# Patient Record
Sex: MALE | Race: BLACK OR AFRICAN AMERICAN | NOT HISPANIC OR LATINO | Employment: FULL TIME | URBAN - METROPOLITAN AREA
[De-identification: names, ages, dates, MRNs, and addresses within clinical notes are randomized per-mention and may not be internally consistent; named-entity substitution may affect disease eponyms.]

---

## 2017-01-20 DIAGNOSIS — I10 ESSENTIAL HYPERTENSION: ICD-10-CM

## 2017-01-23 RX ORDER — LOSARTAN POTASSIUM AND HYDROCHLOROTHIAZIDE 25; 100 MG/1; MG/1
1 TABLET ORAL DAILY
Qty: 90 TABLET | Refills: 3 | Status: SHIPPED | OUTPATIENT
Start: 2017-01-23 | End: 2018-07-26

## 2017-08-23 ENCOUNTER — PATIENT OUTREACH (OUTPATIENT)
Dept: ADMINISTRATIVE | Facility: HOSPITAL | Age: 52
End: 2017-08-23

## 2018-03-26 ENCOUNTER — PATIENT OUTREACH (OUTPATIENT)
Dept: ADMINISTRATIVE | Facility: HOSPITAL | Age: 53
End: 2018-03-26

## 2018-06-26 ENCOUNTER — PATIENT OUTREACH (OUTPATIENT)
Dept: ADMINISTRATIVE | Facility: HOSPITAL | Age: 53
End: 2018-06-26

## 2018-06-26 NOTE — PROGRESS NOTES
I have attempted without success to contact this patient to schedule an appointment for annual to include blood pressure recheck and other health maintenance. Patient would like to call back after vacation.

## 2018-07-19 ENCOUNTER — PATIENT OUTREACH (OUTPATIENT)
Dept: ADMINISTRATIVE | Facility: HOSPITAL | Age: 53
End: 2018-07-19

## 2018-07-19 NOTE — PROGRESS NOTES
Schedule patient for blood pressure recheck and annual exam on 07/26/18 at 07:00 am with   Dr. Cameron. Patient in agreement and vocalize understanding. I will send appointment reminder in mail today.

## 2018-07-26 ENCOUNTER — OFFICE VISIT (OUTPATIENT)
Dept: INTERNAL MEDICINE | Facility: CLINIC | Age: 53
End: 2018-07-26
Payer: COMMERCIAL

## 2018-07-26 VITALS
DIASTOLIC BLOOD PRESSURE: 100 MMHG | WEIGHT: 294.13 LBS | SYSTOLIC BLOOD PRESSURE: 160 MMHG | HEART RATE: 98 BPM | OXYGEN SATURATION: 99 % | HEIGHT: 76 IN | TEMPERATURE: 98 F | BODY MASS INDEX: 35.82 KG/M2

## 2018-07-26 DIAGNOSIS — Z00.00 ANNUAL PHYSICAL EXAM: Primary | ICD-10-CM

## 2018-07-26 DIAGNOSIS — Z28.39 IMMUNIZATION DEFICIENCY: ICD-10-CM

## 2018-07-26 DIAGNOSIS — Z12.11 COLON CANCER SCREENING: ICD-10-CM

## 2018-07-26 DIAGNOSIS — R03.0 ELEVATED BLOOD PRESSURE READING: ICD-10-CM

## 2018-07-26 DIAGNOSIS — I10 ESSENTIAL HYPERTENSION: ICD-10-CM

## 2018-07-26 DIAGNOSIS — Z12.5 SCREENING FOR PROSTATE CANCER: ICD-10-CM

## 2018-07-26 LAB
BASOPHILS # BLD AUTO: 0.07 K/UL
BASOPHILS NFR BLD: 1.1 %
BILIRUB UR QL STRIP: NEGATIVE
CLARITY UR REFRACT.AUTO: CLEAR
COLOR UR AUTO: YELLOW
DIFFERENTIAL METHOD: NORMAL
EOSINOPHIL # BLD AUTO: 0.2 K/UL
EOSINOPHIL NFR BLD: 3.7 %
ERYTHROCYTE [DISTWIDTH] IN BLOOD BY AUTOMATED COUNT: 13 %
GLUCOSE UR QL STRIP: NEGATIVE
HCT VFR BLD AUTO: 45.1 %
HGB BLD-MCNC: 15.1 G/DL
HGB UR QL STRIP: NEGATIVE
IMM GRANULOCYTES # BLD AUTO: 0.01 K/UL
IMM GRANULOCYTES NFR BLD AUTO: 0.2 %
KETONES UR QL STRIP: NEGATIVE
LEUKOCYTE ESTERASE UR QL STRIP: NEGATIVE
LYMPHOCYTES # BLD AUTO: 2.1 K/UL
LYMPHOCYTES NFR BLD: 31.9 %
MCH RBC QN AUTO: 29.9 PG
MCHC RBC AUTO-ENTMCNC: 33.5 G/DL
MCV RBC AUTO: 89 FL
MONOCYTES # BLD AUTO: 0.5 K/UL
MONOCYTES NFR BLD: 7.9 %
NEUTROPHILS # BLD AUTO: 3.6 K/UL
NEUTROPHILS NFR BLD: 55.2 %
NITRITE UR QL STRIP: NEGATIVE
NRBC BLD-RTO: 0 /100 WBC
PH UR STRIP: 5 [PH] (ref 5–8)
PLATELET # BLD AUTO: 279 K/UL
PMV BLD AUTO: 11.5 FL
PROT UR QL STRIP: NEGATIVE
RBC # BLD AUTO: 5.05 M/UL
SP GR UR STRIP: 1.01 (ref 1–1.03)
URN SPEC COLLECT METH UR: NORMAL
UROBILINOGEN UR STRIP-ACNC: NEGATIVE EU/DL
WBC # BLD AUTO: 6.48 K/UL

## 2018-07-26 PROCEDURE — 80053 COMPREHEN METABOLIC PANEL: CPT

## 2018-07-26 PROCEDURE — 84439 ASSAY OF FREE THYROXINE: CPT

## 2018-07-26 PROCEDURE — 90471 IMMUNIZATION ADMIN: CPT | Mod: S$GLB,,, | Performed by: FAMILY MEDICINE

## 2018-07-26 PROCEDURE — 3008F BODY MASS INDEX DOCD: CPT | Mod: CPTII,S$GLB,, | Performed by: FAMILY MEDICINE

## 2018-07-26 PROCEDURE — 90715 TDAP VACCINE 7 YRS/> IM: CPT | Mod: S$GLB,,, | Performed by: FAMILY MEDICINE

## 2018-07-26 PROCEDURE — 99214 OFFICE O/P EST MOD 30 MIN: CPT | Mod: 25,S$GLB,, | Performed by: FAMILY MEDICINE

## 2018-07-26 PROCEDURE — 93010 ELECTROCARDIOGRAM REPORT: CPT | Mod: S$GLB,,, | Performed by: INTERNAL MEDICINE

## 2018-07-26 PROCEDURE — 3080F DIAST BP >= 90 MM HG: CPT | Mod: CPTII,S$GLB,, | Performed by: FAMILY MEDICINE

## 2018-07-26 PROCEDURE — 84443 ASSAY THYROID STIM HORMONE: CPT

## 2018-07-26 PROCEDURE — 85025 COMPLETE CBC W/AUTO DIFF WBC: CPT

## 2018-07-26 PROCEDURE — 80061 LIPID PANEL: CPT

## 2018-07-26 PROCEDURE — 84153 ASSAY OF PSA TOTAL: CPT

## 2018-07-26 PROCEDURE — 3077F SYST BP >= 140 MM HG: CPT | Mod: CPTII,S$GLB,, | Performed by: FAMILY MEDICINE

## 2018-07-26 PROCEDURE — 99999 PR PBB SHADOW E&M-EST. PATIENT-LVL IV: CPT | Mod: PBBFAC,,, | Performed by: FAMILY MEDICINE

## 2018-07-26 PROCEDURE — 93005 ELECTROCARDIOGRAM TRACING: CPT | Mod: S$GLB,,, | Performed by: FAMILY MEDICINE

## 2018-07-26 PROCEDURE — 81003 URINALYSIS AUTO W/O SCOPE: CPT

## 2018-07-26 RX ORDER — HYDROCHLOROTHIAZIDE 25 MG/1
25 TABLET ORAL DAILY
Qty: 90 TABLET | Refills: 3 | Status: SHIPPED | OUTPATIENT
Start: 2018-07-26 | End: 2019-08-28 | Stop reason: SDUPTHER

## 2018-07-26 RX ORDER — AMLODIPINE BESYLATE 10 MG/1
10 TABLET ORAL DAILY
Qty: 90 TABLET | Refills: 3 | Status: SHIPPED | OUTPATIENT
Start: 2018-07-26 | End: 2018-12-04 | Stop reason: SDUPTHER

## 2018-07-26 NOTE — PROGRESS NOTES
Subjective:       Patient ID: Neftaly Sousa is a 53 y.o. male.    Chief Complaint: Annual Exam    Annual exam.  Follow-up hypertension.  He has not taken blood pressure medicine several days.  He wants to change his blood pressure medication because he does not feel well when taken it.  He denies headache chest pain palpitations shortness of breath or edema. 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  Family history includes hypertension for cancer lung cancer and diabetes.      Review of Systems   Constitutional: Negative for activity change, appetite change, fatigue and unexpected weight change.   HENT: Negative for congestion, ear pain, hearing loss, sneezing, sore throat, tinnitus and trouble swallowing.    Eyes: Negative for pain, redness and visual disturbance.   Respiratory: Negative for cough, chest tightness, shortness of breath and wheezing.    Cardiovascular: Negative for chest pain, palpitations and leg swelling.   Gastrointestinal: Negative for abdominal distention, abdominal pain, blood in stool, constipation, diarrhea, nausea, rectal pain and vomiting.   Endocrine: Negative for polydipsia and polyuria.   Genitourinary: Negative for difficulty urinating, dysuria, frequency, hematuria and urgency.   Musculoskeletal: Negative for arthralgias, back pain, joint swelling, myalgias, neck pain and neck stiffness.   Skin: Negative for rash and wound.   Neurological: Negative for dizziness, tremors, syncope, light-headedness, numbness and headaches.   Hematological: Negative for adenopathy. Does not bruise/bleed easily.   Psychiatric/Behavioral: Negative for agitation, confusion, dysphoric mood and sleep disturbance. The patient is not nervous/anxious.        Objective:      Physical Exam   Constitutional: He is oriented to person, place, and time. He appears well-developed and well-nourished.   HENT:   Right Ear: External ear normal.   Left Ear: External ear normal.   Nose: Nose normal.    Mouth/Throat: Oropharynx is clear and moist.   Eyes: Conjunctivae and EOM are normal. Pupils are equal, round, and reactive to light.   Neck: Normal range of motion. Neck supple. No JVD present. No thyromegaly present.   Cardiovascular: Normal rate, regular rhythm, normal heart sounds and intact distal pulses.  Exam reveals no gallop and no friction rub.    No murmur heard.  Pulmonary/Chest: Effort normal and breath sounds normal. No respiratory distress. He has no wheezes. He has no rales.   Abdominal: Soft. Bowel sounds are normal. He exhibits no distension and no mass. There is no tenderness.   Musculoskeletal: Normal range of motion. He exhibits no edema or tenderness.   Lymphadenopathy:     He has no cervical adenopathy.   Neurological: He is alert and oriented to person, place, and time. He has normal reflexes. He displays normal reflexes. No cranial nerve deficit. He exhibits normal muscle tone. Coordination normal.   Skin: Skin is warm and dry. No rash noted.   Psychiatric: He has a normal mood and affect. His behavior is normal. Judgment and thought content normal.       Office Visit on 11/02/2015   Component Date Value Ref Range Status    Sodium 11/02/2015 142  136 - 145 mmol/L Final    Potassium 11/02/2015 4.6  3.5 - 5.1 mmol/L Final    Chloride 11/02/2015 105  95 - 110 mmol/L Final    CO2 11/02/2015 31* 23 - 29 mmol/L Final    Glucose 11/02/2015 93  70 - 110 mg/dL Final    BUN, Bld 11/02/2015 11  6 - 20 mg/dL Final    Creatinine 11/02/2015 0.9  0.5 - 1.4 mg/dL Final    Calcium 11/02/2015 9.7  8.7 - 10.5 mg/dL Final    Total Protein 11/02/2015 7.3  6.0 - 8.4 g/dL Final    Albumin 11/02/2015 4.1  3.5 - 5.2 g/dL Final    Total Bilirubin 11/02/2015 0.6  0.1 - 1.0 mg/dL Final    Alkaline Phosphatase 11/02/2015 65  55 - 135 U/L Final    AST 11/02/2015 26  10 - 40 U/L Final    ALT 11/02/2015 30  10 - 44 U/L Final    Anion Gap 11/02/2015 6* 8 - 16 mmol/L Final    eGFR if   11/02/2015 >60.0  >60 mL/min/1.73 m^2 Final    eGFR if non African American 11/02/2015 >60.0  >60 mL/min/1.73 m^2 Final    WBC 11/02/2015 6.05  3.90 - 12.70 K/uL Final    RBC 11/02/2015 5.01  4.60 - 6.20 M/uL Final    Hemoglobin 11/02/2015 15.0  14.0 - 18.0 g/dL Final    Hematocrit 11/02/2015 44.6  40.0 - 54.0 % Final    MCV 11/02/2015 89  82 - 98 fL Final    MCH 11/02/2015 29.9  27.0 - 31.0 pg Final    MCHC 11/02/2015 33.6  32.0 - 36.0 % Final    RDW 11/02/2015 12.9  11.5 - 14.5 % Final    Platelets 11/02/2015 252  150 - 350 K/uL Final    MPV 11/02/2015 11.0  9.2 - 12.9 fL Final    Gran # (ANC) 11/02/2015 3.3  1.8 - 7.7 K/uL Final    Lymph # 11/02/2015 2.0  1.0 - 4.8 K/uL Final    Mono # 11/02/2015 0.5  0.3 - 1.0 K/uL Final    Eos # 11/02/2015 0.2  0.0 - 0.5 K/uL Final    Baso # 11/02/2015 0.03  0.00 - 0.20 K/uL Final    Gran% 11/02/2015 54.5  38.0 - 73.0 % Final    Lymph% 11/02/2015 33.1  18.0 - 48.0 % Final    Mono% 11/02/2015 7.9  4.0 - 15.0 % Final    Eosinophil% 11/02/2015 3.8  0.0 - 8.0 % Final    Basophil% 11/02/2015 0.5  0.0 - 1.9 % Final    Differential Method 11/02/2015 Automated   Final    Specimen UA 11/02/2015 Urine, Clean Catch   Final    Color, UA 11/02/2015 Yellow  Yellow, Straw, Mel Final    Appearance, UA 11/02/2015 Clear  Clear Final    pH, UA 11/02/2015 7.0  5.0 - 8.0 Final    Specific Gravity, UA 11/02/2015 1.020  1.005 - 1.030 Final    Protein, UA 11/02/2015 Negative  Negative Final    Glucose, UA 11/02/2015 Negative  Negative Final    Ketones, UA 11/02/2015 Negative  Negative Final    Bilirubin (UA) 11/02/2015 Negative  Negative Final    Occult Blood UA 11/02/2015 Negative  Negative Final    Nitrite, UA 11/02/2015 Negative  Negative Final    Urobilinogen, UA 11/02/2015 Negative  <2.0 EU/dL Final    Leukocytes, UA 11/02/2015 Negative  Negative Final    PSA, SCREEN 11/02/2015 0.67  0.00 - 4.00 ng/mL Final    Hepatitis C Ab 11/02/2015 Negative   Final     Cholesterol 11/02/2015 155  120 - 199 mg/dL Final    Triglycerides 11/02/2015 71  30 - 150 mg/dL Final    HDL 11/02/2015 43  40 - 75 mg/dL Final    LDL Cholesterol 11/02/2015 97.8  63.0 - 159.0 mg/dL Final    HDL/Chol Ratio 11/02/2015 27.7  20.0 - 50.0 % Final    Total Cholesterol/HDL Ratio 11/02/2015 3.6  2.0 - 5.0 Final    Non-HDL Cholesterol 11/02/2015 112  mg/dL Final     Assessment:       1. Essential hypertension    2. Colon cancer screening    3. Screening for prostate cancer    4. Immunization deficiency        Plan:   Lab EKG was ordered.  Colonoscopy referral.  Tdap given today.  Blood pressure medicine be changed to amlodipine 10 mg and hydrochlorothiazide 25 mg daily.  Follow-up 2 weeks regards elevated blood pressure.      Essential hypertension  -     CBC auto differential  -     Urinalysis  -     Comprehensive metabolic panel  -     Lipid panel  -     TSH  -     EKG 12-lead    Colon cancer screening  -     Case request GI: COLONOSCOPY    Screening for prostate cancer  -     PSA, Screening    Immunization deficiency  -     (In Office Administered) Tdap Vaccine    Other orders  -     hydroCHLOROthiazide (HYDRODIURIL) 25 MG tablet; Take 1 tablet (25 mg total) by mouth once daily.  Dispense: 90 tablet; Refill: 3  -     amLODIPine (NORVASC) 10 MG tablet; Take 1 tablet (10 mg total) by mouth once daily.  Dispense: 90 tablet; Refill: 3

## 2018-07-27 LAB
ALBUMIN SERPL BCP-MCNC: 4.2 G/DL
ALP SERPL-CCNC: 77 U/L
ALT SERPL W/O P-5'-P-CCNC: 31 U/L
ANION GAP SERPL CALC-SCNC: 5 MMOL/L
AST SERPL-CCNC: 29 U/L
BILIRUB SERPL-MCNC: 0.8 MG/DL
BUN SERPL-MCNC: 18 MG/DL
CALCIUM SERPL-MCNC: 9.9 MG/DL
CHLORIDE SERPL-SCNC: 103 MMOL/L
CHOLEST SERPL-MCNC: 158 MG/DL
CHOLEST/HDLC SERPL: 4 {RATIO}
CO2 SERPL-SCNC: 31 MMOL/L
COMPLEXED PSA SERPL-MCNC: 0.41 NG/ML
CREAT SERPL-MCNC: 1 MG/DL
EST. GFR  (AFRICAN AMERICAN): >60 ML/MIN/1.73 M^2
EST. GFR  (NON AFRICAN AMERICAN): >60 ML/MIN/1.73 M^2
GLUCOSE SERPL-MCNC: 105 MG/DL
HDLC SERPL-MCNC: 40 MG/DL
HDLC SERPL: 25.3 %
LDLC SERPL CALC-MCNC: 97.4 MG/DL
NONHDLC SERPL-MCNC: 118 MG/DL
POTASSIUM SERPL-SCNC: 3.9 MMOL/L
PROT SERPL-MCNC: 7.8 G/DL
SODIUM SERPL-SCNC: 139 MMOL/L
T4 FREE SERPL-MCNC: 1 NG/DL
TRIGL SERPL-MCNC: 103 MG/DL
TSH SERPL DL<=0.005 MIU/L-ACNC: 4.03 UIU/ML

## 2018-07-30 ENCOUNTER — TELEPHONE (OUTPATIENT)
Dept: INTERNAL MEDICINE | Facility: CLINIC | Age: 53
End: 2018-07-30

## 2018-07-30 NOTE — TELEPHONE ENCOUNTER
----- Message from Mariposa Vital sent at 7/30/2018  4:04 PM CDT -----  Contact: pt  Pt stated he's calling for results, he can be reached at [phone] Thanks

## 2018-08-02 ENCOUNTER — DOCUMENTATION ONLY (OUTPATIENT)
Dept: ENDOSCOPY | Facility: HOSPITAL | Age: 53
End: 2018-08-02

## 2018-08-08 ENCOUNTER — OFFICE VISIT (OUTPATIENT)
Dept: INTERNAL MEDICINE | Facility: CLINIC | Age: 53
End: 2018-08-08
Payer: COMMERCIAL

## 2018-08-08 VITALS
DIASTOLIC BLOOD PRESSURE: 80 MMHG | SYSTOLIC BLOOD PRESSURE: 138 MMHG | WEIGHT: 296.88 LBS | TEMPERATURE: 97 F | HEART RATE: 86 BPM | BODY MASS INDEX: 36.13 KG/M2 | OXYGEN SATURATION: 98 %

## 2018-08-08 DIAGNOSIS — I10 ESSENTIAL HYPERTENSION: Primary | ICD-10-CM

## 2018-08-08 DIAGNOSIS — R03.0 ELEVATED BLOOD PRESSURE READING: ICD-10-CM

## 2018-08-08 PROCEDURE — 3075F SYST BP GE 130 - 139MM HG: CPT | Mod: CPTII,S$GLB,, | Performed by: FAMILY MEDICINE

## 2018-08-08 PROCEDURE — 3008F BODY MASS INDEX DOCD: CPT | Mod: CPTII,S$GLB,, | Performed by: FAMILY MEDICINE

## 2018-08-08 PROCEDURE — 3079F DIAST BP 80-89 MM HG: CPT | Mod: CPTII,S$GLB,, | Performed by: FAMILY MEDICINE

## 2018-08-08 PROCEDURE — 99213 OFFICE O/P EST LOW 20 MIN: CPT | Mod: S$GLB,,, | Performed by: FAMILY MEDICINE

## 2018-08-08 PROCEDURE — 99999 PR PBB SHADOW E&M-EST. PATIENT-LVL III: CPT | Mod: PBBFAC,,, | Performed by: FAMILY MEDICINE

## 2018-08-08 NOTE — PROGRESS NOTES
Subjective:       Patient ID: Neftaly Sousa is a 53 y.o. male.    Chief Complaint: 2week f/u (HBP)    Follow-up hypertension.  Medication changes last visit Norvasc 10 mg HCTZ 25 mg a day.  He denies any side effects.  He denies headache chest pain palpitations shortness of breath or edema.      Review of Systems   Constitutional: Negative for appetite change, fatigue and unexpected weight change.   Respiratory: Negative for cough, shortness of breath and wheezing.    Cardiovascular: Negative for chest pain, palpitations and leg swelling.   Gastrointestinal: Negative for abdominal pain.   Genitourinary: Negative for difficulty urinating.   Neurological: Negative for headaches.       Objective:      Physical Exam   Constitutional: He is oriented to person, place, and time. He appears well-developed and well-nourished. No distress.   Neck: Neck supple. No thyromegaly present.   Cardiovascular: Normal rate, regular rhythm and normal heart sounds.    No murmur heard.  Pulmonary/Chest: Effort normal and breath sounds normal. No respiratory distress. He has no wheezes.   Abdominal: Soft. Bowel sounds are normal. He exhibits no mass. There is no tenderness.   Lymphadenopathy:     He has no cervical adenopathy.   Neurological: He is alert and oriented to person, place, and time.       Office Visit on 07/26/2018   Component Date Value Ref Range Status    WBC 07/26/2018 6.48  3.90 - 12.70 K/uL Final    RBC 07/26/2018 5.05  4.60 - 6.20 M/uL Final    Hemoglobin 07/26/2018 15.1  14.0 - 18.0 g/dL Final    Hematocrit 07/26/2018 45.1  40.0 - 54.0 % Final    MCV 07/26/2018 89  82 - 98 fL Final    MCH 07/26/2018 29.9  27.0 - 31.0 pg Final    MCHC 07/26/2018 33.5  32.0 - 36.0 g/dL Final    RDW 07/26/2018 13.0  11.5 - 14.5 % Final    Platelets 07/26/2018 279  150 - 350 K/uL Final    MPV 07/26/2018 11.5  9.2 - 12.9 fL Final    Immature Granulocytes 07/26/2018 0.2  0.0 - 0.5 % Final    Gran # (ANC) 07/26/2018 3.6  1.8 -  7.7 K/uL Final    Immature Grans (Abs) 07/26/2018 0.01  0.00 - 0.04 K/uL Final    Lymph # 07/26/2018 2.1  1.0 - 4.8 K/uL Final    Mono # 07/26/2018 0.5  0.3 - 1.0 K/uL Final    Eos # 07/26/2018 0.2  0.0 - 0.5 K/uL Final    Baso # 07/26/2018 0.07  0.00 - 0.20 K/uL Final    nRBC 07/26/2018 0  0 /100 WBC Final    Gran% 07/26/2018 55.2  38.0 - 73.0 % Final    Lymph% 07/26/2018 31.9  18.0 - 48.0 % Final    Mono% 07/26/2018 7.9  4.0 - 15.0 % Final    Eosinophil% 07/26/2018 3.7  0.0 - 8.0 % Final    Basophil% 07/26/2018 1.1  0.0 - 1.9 % Final    Differential Method 07/26/2018 Automated   Final    Specimen UA 07/26/2018 Urine, Clean Catch   Final    Color, UA 07/26/2018 Yellow  Yellow, Straw, Mel Final    Appearance, UA 07/26/2018 Clear  Clear Final    pH, UA 07/26/2018 5.0  5.0 - 8.0 Final    Specific Gravity, UA 07/26/2018 1.015  1.005 - 1.030 Final    Protein, UA 07/26/2018 Negative  Negative Final    Glucose, UA 07/26/2018 Negative  Negative Final    Ketones, UA 07/26/2018 Negative  Negative Final    Bilirubin (UA) 07/26/2018 Negative  Negative Final    Occult Blood UA 07/26/2018 Negative  Negative Final    Nitrite, UA 07/26/2018 Negative  Negative Final    Urobilinogen, UA 07/26/2018 Negative  <2.0 EU/dL Final    Leukocytes, UA 07/26/2018 Negative  Negative Final    Sodium 07/26/2018 139  136 - 145 mmol/L Final    Potassium 07/26/2018 3.9  3.5 - 5.1 mmol/L Final    Chloride 07/26/2018 103  95 - 110 mmol/L Final    CO2 07/26/2018 31* 23 - 29 mmol/L Final    Glucose 07/26/2018 105  70 - 110 mg/dL Final    BUN, Bld 07/26/2018 18  6 - 20 mg/dL Final    Creatinine 07/26/2018 1.0  0.5 - 1.4 mg/dL Final    Calcium 07/26/2018 9.9  8.7 - 10.5 mg/dL Final    Total Protein 07/26/2018 7.8  6.0 - 8.4 g/dL Final    Albumin 07/26/2018 4.2  3.5 - 5.2 g/dL Final    Total Bilirubin 07/26/2018 0.8  0.1 - 1.0 mg/dL Final    Alkaline Phosphatase 07/26/2018 77  55 - 135 U/L Final    AST 07/26/2018 29   10 - 40 U/L Final    ALT 07/26/2018 31  10 - 44 U/L Final    Anion Gap 07/26/2018 5* 8 - 16 mmol/L Final    eGFR if African American 07/26/2018 >60.0  >60 mL/min/1.73 m^2 Final    eGFR if non African American 07/26/2018 >60.0  >60 mL/min/1.73 m^2 Final    Cholesterol 07/26/2018 158  120 - 199 mg/dL Final    Triglycerides 07/26/2018 103  30 - 150 mg/dL Final    HDL 07/26/2018 40  40 - 75 mg/dL Final    LDL Cholesterol 07/26/2018 97.4  63.0 - 159.0 mg/dL Final    HDL/Chol Ratio 07/26/2018 25.3  20.0 - 50.0 % Final    Total Cholesterol/HDL Ratio 07/26/2018 4.0  2.0 - 5.0 Final    Non-HDL Cholesterol 07/26/2018 118  mg/dL Final    TSH 07/26/2018 4.025* 0.400 - 4.000 uIU/mL Final    PSA, SCREEN 07/26/2018 0.41  0.00 - 4.00 ng/mL Final    Free T4 07/26/2018 1.00  0.71 - 1.51 ng/dL Final     Assessment:       No diagnosis found.    Plan:     Blood pressure recheck 138/80.  Continue current medications.  Discussed low-salt diet increased walking and follow-up in 1 month.    There are no diagnoses linked to this encounter.

## 2018-08-29 ENCOUNTER — DOCUMENTATION ONLY (OUTPATIENT)
Dept: ENDOSCOPY | Facility: HOSPITAL | Age: 53
End: 2018-08-29

## 2018-08-29 ENCOUNTER — PATIENT OUTREACH (OUTPATIENT)
Dept: ADMINISTRATIVE | Facility: HOSPITAL | Age: 53
End: 2018-08-29

## 2018-08-29 NOTE — PROGRESS NOTES
8/29/2018 1214 Received a message from care coordination to schedule colonoscopy.  Called pt; pt states he is not ready to schedule. Patient was provided the phone number to scheduling.  He stated he will call back when he's ready.

## 2018-08-29 NOTE — LETTER
August 29, 2018        Neftaly Sousa  [id number] [facility] [address]      Dear Mr. Sousa,    You have an upcoming appointment with Suleman Cameron MD on 09/1218.      Your chart is indicating you may be due for the following and I will be happy to assist you in scheduling any needed appointments:  Health Maintenance Due   Topic    Colonoscopy (To schedule colonoscopy you are welcome to call our scheduling line directly at [phone])    Influenza Vaccine           If you have had any of the above done at another facility, please bring the records or information with you so that your record at [facility] will be complete.    We will be happy to assist you with scheduling any necessary appointments or you may contact the [facility] appointment desk at [phone] to schedule at your convenience.     Thank you for choosing [facility] for your healthcare needs,    Louise PENN LPN Care Coordinator  [facility]  [phone]

## 2018-09-18 ENCOUNTER — OFFICE VISIT (OUTPATIENT)
Dept: INTERNAL MEDICINE | Facility: CLINIC | Age: 53
End: 2018-09-18
Payer: COMMERCIAL

## 2018-09-18 VITALS
SYSTOLIC BLOOD PRESSURE: 142 MMHG | HEART RATE: 99 BPM | WEIGHT: 294 LBS | OXYGEN SATURATION: 98 % | DIASTOLIC BLOOD PRESSURE: 84 MMHG | HEIGHT: 76 IN | BODY MASS INDEX: 35.8 KG/M2 | TEMPERATURE: 97 F

## 2018-09-18 DIAGNOSIS — R03.0 ELEVATED BLOOD PRESSURE READING: ICD-10-CM

## 2018-09-18 DIAGNOSIS — I10 ESSENTIAL HYPERTENSION: Primary | ICD-10-CM

## 2018-09-18 PROCEDURE — 99213 OFFICE O/P EST LOW 20 MIN: CPT | Mod: S$GLB,,, | Performed by: FAMILY MEDICINE

## 2018-09-18 PROCEDURE — 99999 PR PBB SHADOW E&M-EST. PATIENT-LVL III: CPT | Mod: PBBFAC,,, | Performed by: FAMILY MEDICINE

## 2018-09-18 PROCEDURE — 3077F SYST BP >= 140 MM HG: CPT | Mod: CPTII,S$GLB,, | Performed by: FAMILY MEDICINE

## 2018-09-18 PROCEDURE — 3079F DIAST BP 80-89 MM HG: CPT | Mod: CPTII,S$GLB,, | Performed by: FAMILY MEDICINE

## 2018-09-18 PROCEDURE — 3008F BODY MASS INDEX DOCD: CPT | Mod: CPTII,S$GLB,, | Performed by: FAMILY MEDICINE

## 2018-09-18 NOTE — PROGRESS NOTES
Subjective:       Patient ID: Neftaly Sousa is a 53 y.o. male.    Chief Complaint: Follow-up    Follow-up hypertension.  Medications include amlodipine 10 mg hydrochlorothiazide 25 mg a day.  He denies any medication side effects.  He denies headache chest pain palpitations shortness of breath or edema. Home blood pressures have been 140 over 80 range.      Review of Systems   Constitutional: Negative for appetite change, fatigue and unexpected weight change.   Respiratory: Negative for cough, shortness of breath and wheezing.    Cardiovascular: Negative for chest pain, palpitations and leg swelling.   Gastrointestinal: Negative for abdominal pain.   Genitourinary: Negative for difficulty urinating.   Neurological: Negative for headaches.       Objective:      Physical Exam   Constitutional: He is oriented to person, place, and time. He appears well-developed and well-nourished. No distress.   Neck: Neck supple. No thyromegaly present.   Cardiovascular: Normal rate, regular rhythm and normal heart sounds.   No murmur heard.  Pulmonary/Chest: Effort normal and breath sounds normal. No respiratory distress. He has no wheezes.   Abdominal: Soft. Bowel sounds are normal. He exhibits no mass. There is no tenderness.   Lymphadenopathy:     He has no cervical adenopathy.   Neurological: He is alert and oriented to person, place, and time.       Office Visit on 07/26/2018   Component Date Value Ref Range Status    WBC 07/26/2018 6.48  3.90 - 12.70 K/uL Final    RBC 07/26/2018 5.05  4.60 - 6.20 M/uL Final    Hemoglobin 07/26/2018 15.1  14.0 - 18.0 g/dL Final    Hematocrit 07/26/2018 45.1  40.0 - 54.0 % Final    MCV 07/26/2018 89  82 - 98 fL Final    MCH 07/26/2018 29.9  27.0 - 31.0 pg Final    MCHC 07/26/2018 33.5  32.0 - 36.0 g/dL Final    RDW 07/26/2018 13.0  11.5 - 14.5 % Final    Platelets 07/26/2018 279  150 - 350 K/uL Final    MPV 07/26/2018 11.5  9.2 - 12.9 fL Final    Immature Granulocytes 07/26/2018  0.2  0.0 - 0.5 % Final    Gran # (ANC) 07/26/2018 3.6  1.8 - 7.7 K/uL Final    Immature Grans (Abs) 07/26/2018 0.01  0.00 - 0.04 K/uL Final    Lymph # 07/26/2018 2.1  1.0 - 4.8 K/uL Final    Mono # 07/26/2018 0.5  0.3 - 1.0 K/uL Final    Eos # 07/26/2018 0.2  0.0 - 0.5 K/uL Final    Baso # 07/26/2018 0.07  0.00 - 0.20 K/uL Final    nRBC 07/26/2018 0  0 /100 WBC Final    Gran% 07/26/2018 55.2  38.0 - 73.0 % Final    Lymph% 07/26/2018 31.9  18.0 - 48.0 % Final    Mono% 07/26/2018 7.9  4.0 - 15.0 % Final    Eosinophil% 07/26/2018 3.7  0.0 - 8.0 % Final    Basophil% 07/26/2018 1.1  0.0 - 1.9 % Final    Differential Method 07/26/2018 Automated   Final    Specimen UA 07/26/2018 Urine, Clean Catch   Final    Color, UA 07/26/2018 Yellow  Yellow, Straw, Mel Final    Appearance, UA 07/26/2018 Clear  Clear Final    pH, UA 07/26/2018 5.0  5.0 - 8.0 Final    Specific Gravity, UA 07/26/2018 1.015  1.005 - 1.030 Final    Protein, UA 07/26/2018 Negative  Negative Final    Glucose, UA 07/26/2018 Negative  Negative Final    Ketones, UA 07/26/2018 Negative  Negative Final    Bilirubin (UA) 07/26/2018 Negative  Negative Final    Occult Blood UA 07/26/2018 Negative  Negative Final    Nitrite, UA 07/26/2018 Negative  Negative Final    Urobilinogen, UA 07/26/2018 Negative  <2.0 EU/dL Final    Leukocytes, UA 07/26/2018 Negative  Negative Final    Sodium 07/26/2018 139  136 - 145 mmol/L Final    Potassium 07/26/2018 3.9  3.5 - 5.1 mmol/L Final    Chloride 07/26/2018 103  95 - 110 mmol/L Final    CO2 07/26/2018 31* 23 - 29 mmol/L Final    Glucose 07/26/2018 105  70 - 110 mg/dL Final    BUN, Bld 07/26/2018 18  6 - 20 mg/dL Final    Creatinine 07/26/2018 1.0  0.5 - 1.4 mg/dL Final    Calcium 07/26/2018 9.9  8.7 - 10.5 mg/dL Final    Total Protein 07/26/2018 7.8  6.0 - 8.4 g/dL Final    Albumin 07/26/2018 4.2  3.5 - 5.2 g/dL Final    Total Bilirubin 07/26/2018 0.8  0.1 - 1.0 mg/dL Final    Alkaline  Phosphatase 07/26/2018 77  55 - 135 U/L Final    AST 07/26/2018 29  10 - 40 U/L Final    ALT 07/26/2018 31  10 - 44 U/L Final    Anion Gap 07/26/2018 5* 8 - 16 mmol/L Final    eGFR if African American 07/26/2018 >60.0  >60 mL/min/1.73 m^2 Final    eGFR if non African American 07/26/2018 >60.0  >60 mL/min/1.73 m^2 Final    Cholesterol 07/26/2018 158  120 - 199 mg/dL Final    Triglycerides 07/26/2018 103  30 - 150 mg/dL Final    HDL 07/26/2018 40  40 - 75 mg/dL Final    LDL Cholesterol 07/26/2018 97.4  63.0 - 159.0 mg/dL Final    HDL/Chol Ratio 07/26/2018 25.3  20.0 - 50.0 % Final    Total Cholesterol/HDL Ratio 07/26/2018 4.0  2.0 - 5.0 Final    Non-HDL Cholesterol 07/26/2018 118  mg/dL Final    TSH 07/26/2018 4.025* 0.400 - 4.000 uIU/mL Final    PSA, SCREEN 07/26/2018 0.41  0.00 - 4.00 ng/mL Final    Free T4 07/26/2018 1.00  0.71 - 1.51 ng/dL Final     Assessment:       No diagnosis found.    Plan:   Blood pressure 142/80.  Continue regular exercise  Improve diet and decrease salt.  Follow-up in 1 month.  Continue current medications    There are no diagnoses linked to this encounter.

## 2018-10-29 PROBLEM — Z00.00 ANNUAL PHYSICAL EXAM: Status: RESOLVED | Noted: 2018-07-26 | Resolved: 2018-10-29

## 2018-10-30 ENCOUNTER — PATIENT OUTREACH (OUTPATIENT)
Dept: ADMINISTRATIVE | Facility: HOSPITAL | Age: 53
End: 2018-10-30

## 2018-12-03 ENCOUNTER — PATIENT OUTREACH (OUTPATIENT)
Dept: ADMINISTRATIVE | Facility: HOSPITAL | Age: 53
End: 2018-12-03

## 2018-12-04 ENCOUNTER — OFFICE VISIT (OUTPATIENT)
Dept: INTERNAL MEDICINE | Facility: CLINIC | Age: 53
End: 2018-12-04
Payer: COMMERCIAL

## 2018-12-04 VITALS
TEMPERATURE: 98 F | OXYGEN SATURATION: 98 % | DIASTOLIC BLOOD PRESSURE: 84 MMHG | BODY MASS INDEX: 36.08 KG/M2 | HEART RATE: 56 BPM | SYSTOLIC BLOOD PRESSURE: 136 MMHG | WEIGHT: 296.44 LBS

## 2018-12-04 DIAGNOSIS — I10 ESSENTIAL HYPERTENSION: Primary | ICD-10-CM

## 2018-12-04 DIAGNOSIS — T88.7XXA MEDICATION SIDE EFFECTS: ICD-10-CM

## 2018-12-04 PROCEDURE — 3075F SYST BP GE 130 - 139MM HG: CPT | Mod: CPTII,S$GLB,, | Performed by: FAMILY MEDICINE

## 2018-12-04 PROCEDURE — 99999 PR PBB SHADOW E&M-EST. PATIENT-LVL III: CPT | Mod: PBBFAC,,, | Performed by: FAMILY MEDICINE

## 2018-12-04 PROCEDURE — 3079F DIAST BP 80-89 MM HG: CPT | Mod: CPTII,S$GLB,, | Performed by: FAMILY MEDICINE

## 2018-12-04 PROCEDURE — 99213 OFFICE O/P EST LOW 20 MIN: CPT | Mod: S$GLB,,, | Performed by: FAMILY MEDICINE

## 2018-12-04 PROCEDURE — 3008F BODY MASS INDEX DOCD: CPT | Mod: CPTII,S$GLB,, | Performed by: FAMILY MEDICINE

## 2018-12-04 RX ORDER — AMLODIPINE BESYLATE 10 MG/1
5 TABLET ORAL DAILY
Qty: 90 TABLET | Refills: 3
Start: 2018-12-04 | End: 2019-08-28 | Stop reason: SDUPTHER

## 2018-12-04 NOTE — PROGRESS NOTES
Subjective:       Patient ID: Neftaly Sousa is a 53 y.o. male.    Chief Complaint: 1 mth f/u (HBP)    He reports side effects of current medications that include HCTZ 25 mg and amlodipine 10 mg daily.  He reports erectile dysfunction and feels off balance.  Previously was on losartan HCTZ which was discontinued due to side effects.  Denies headache chest pain palpitations shortness of breath or edema.      Review of Systems   Constitutional: Negative for appetite change, fatigue and unexpected weight change.   Respiratory: Negative for cough, shortness of breath and wheezing.    Cardiovascular: Negative for chest pain, palpitations and leg swelling.   Gastrointestinal: Negative for abdominal pain.   Genitourinary: Negative for difficulty urinating.   Neurological: Positive for dizziness. Negative for headaches.       Objective:      Physical Exam   Constitutional: He is oriented to person, place, and time. He appears well-developed and well-nourished. No distress.   Neck: Neck supple. No thyromegaly present.   Cardiovascular: Normal rate, regular rhythm and normal heart sounds.   No murmur heard.  Pulmonary/Chest: Effort normal and breath sounds normal. No respiratory distress. He has no wheezes.   Abdominal: Soft. Bowel sounds are normal. He exhibits no mass. There is no tenderness.   Lymphadenopathy:     He has no cervical adenopathy.   Neurological: He is alert and oriented to person, place, and time.       Office Visit on 07/26/2018   Component Date Value Ref Range Status    WBC 07/26/2018 6.48  3.90 - 12.70 K/uL Final    RBC 07/26/2018 5.05  4.60 - 6.20 M/uL Final    Hemoglobin 07/26/2018 15.1  14.0 - 18.0 g/dL Final    Hematocrit 07/26/2018 45.1  40.0 - 54.0 % Final    MCV 07/26/2018 89  82 - 98 fL Final    MCH 07/26/2018 29.9  27.0 - 31.0 pg Final    MCHC 07/26/2018 33.5  32.0 - 36.0 g/dL Final    RDW 07/26/2018 13.0  11.5 - 14.5 % Final    Platelets 07/26/2018 279  150 - 350 K/uL Final    MPV  07/26/2018 11.5  9.2 - 12.9 fL Final    Immature Granulocytes 07/26/2018 0.2  0.0 - 0.5 % Final    Gran # (ANC) 07/26/2018 3.6  1.8 - 7.7 K/uL Final    Immature Grans (Abs) 07/26/2018 0.01  0.00 - 0.04 K/uL Final    Lymph # 07/26/2018 2.1  1.0 - 4.8 K/uL Final    Mono # 07/26/2018 0.5  0.3 - 1.0 K/uL Final    Eos # 07/26/2018 0.2  0.0 - 0.5 K/uL Final    Baso # 07/26/2018 0.07  0.00 - 0.20 K/uL Final    nRBC 07/26/2018 0  0 /100 WBC Final    Gran% 07/26/2018 55.2  38.0 - 73.0 % Final    Lymph% 07/26/2018 31.9  18.0 - 48.0 % Final    Mono% 07/26/2018 7.9  4.0 - 15.0 % Final    Eosinophil% 07/26/2018 3.7  0.0 - 8.0 % Final    Basophil% 07/26/2018 1.1  0.0 - 1.9 % Final    Differential Method 07/26/2018 Automated   Final    Specimen UA 07/26/2018 Urine, Clean Catch   Final    Color, UA 07/26/2018 Yellow  Yellow, Straw, Mel Final    Appearance, UA 07/26/2018 Clear  Clear Final    pH, UA 07/26/2018 5.0  5.0 - 8.0 Final    Specific Gravity, UA 07/26/2018 1.015  1.005 - 1.030 Final    Protein, UA 07/26/2018 Negative  Negative Final    Glucose, UA 07/26/2018 Negative  Negative Final    Ketones, UA 07/26/2018 Negative  Negative Final    Bilirubin (UA) 07/26/2018 Negative  Negative Final    Occult Blood UA 07/26/2018 Negative  Negative Final    Nitrite, UA 07/26/2018 Negative  Negative Final    Urobilinogen, UA 07/26/2018 Negative  <2.0 EU/dL Final    Leukocytes, UA 07/26/2018 Negative  Negative Final    Sodium 07/26/2018 139  136 - 145 mmol/L Final    Potassium 07/26/2018 3.9  3.5 - 5.1 mmol/L Final    Chloride 07/26/2018 103  95 - 110 mmol/L Final    CO2 07/26/2018 31* 23 - 29 mmol/L Final    Glucose 07/26/2018 105  70 - 110 mg/dL Final    BUN, Bld 07/26/2018 18  6 - 20 mg/dL Final    Creatinine 07/26/2018 1.0  0.5 - 1.4 mg/dL Final    Calcium 07/26/2018 9.9  8.7 - 10.5 mg/dL Final    Total Protein 07/26/2018 7.8  6.0 - 8.4 g/dL Final    Albumin 07/26/2018 4.2  3.5 - 5.2 g/dL Final     Total Bilirubin 07/26/2018 0.8  0.1 - 1.0 mg/dL Final    Alkaline Phosphatase 07/26/2018 77  55 - 135 U/L Final    AST 07/26/2018 29  10 - 40 U/L Final    ALT 07/26/2018 31  10 - 44 U/L Final    Anion Gap 07/26/2018 5* 8 - 16 mmol/L Final    eGFR if African American 07/26/2018 >60.0  >60 mL/min/1.73 m^2 Final    eGFR if non African American 07/26/2018 >60.0  >60 mL/min/1.73 m^2 Final    Cholesterol 07/26/2018 158  120 - 199 mg/dL Final    Triglycerides 07/26/2018 103  30 - 150 mg/dL Final    HDL 07/26/2018 40  40 - 75 mg/dL Final    LDL Cholesterol 07/26/2018 97.4  63.0 - 159.0 mg/dL Final    HDL/Chol Ratio 07/26/2018 25.3  20.0 - 50.0 % Final    Total Cholesterol/HDL Ratio 07/26/2018 4.0  2.0 - 5.0 Final    Non-HDL Cholesterol 07/26/2018 118  mg/dL Final    TSH 07/26/2018 4.025* 0.400 - 4.000 uIU/mL Final    PSA, SCREEN 07/26/2018 0.41  0.00 - 4.00 ng/mL Final    Free T4 07/26/2018 1.00  0.71 - 1.51 ng/dL Final     Assessment:       No diagnosis found.    Plan:   Continue HCTZ 25 mg, decreased amlodipine and start 5 mg a day, start lisinopril 10 mg daily.  Follow-up 1 month      There are no diagnoses linked to this encounter.

## 2018-12-05 ENCOUNTER — TELEPHONE (OUTPATIENT)
Dept: INTERNAL MEDICINE | Facility: CLINIC | Age: 53
End: 2018-12-05

## 2018-12-05 NOTE — TELEPHONE ENCOUNTER
----- Message from Hamlet Dixon sent at 12/5/2018  9:01 AM CST -----  Contact: Neftaly Arango states that one of his new blood pressure medications were not called in.

## 2018-12-06 ENCOUNTER — TELEPHONE (OUTPATIENT)
Dept: INTERNAL MEDICINE | Facility: CLINIC | Age: 53
End: 2018-12-06

## 2018-12-06 NOTE — TELEPHONE ENCOUNTER
----- Message from Joy Umanzor sent at 12/6/2018  3:43 PM CST -----  states that adria garcia hasn't gotten 3 rxs, pls adv..[phone] (home)

## 2018-12-06 NOTE — TELEPHONE ENCOUNTER
Spoke with pt bola his med was supposed to go to [facility] pharmacy. Informed I will call it in. Verbalized understanding.

## 2019-07-26 ENCOUNTER — OFFICE VISIT (OUTPATIENT)
Dept: INTERNAL MEDICINE | Facility: CLINIC | Age: 54
End: 2019-07-26
Payer: COMMERCIAL

## 2019-07-26 VITALS
OXYGEN SATURATION: 100 % | HEART RATE: 52 BPM | DIASTOLIC BLOOD PRESSURE: 93 MMHG | TEMPERATURE: 98 F | WEIGHT: 303.38 LBS | BODY MASS INDEX: 36.93 KG/M2 | SYSTOLIC BLOOD PRESSURE: 149 MMHG

## 2019-07-26 DIAGNOSIS — I10 ESSENTIAL HYPERTENSION: Primary | ICD-10-CM

## 2019-07-26 PROBLEM — Z12.11 COLON CANCER SCREENING: Status: RESOLVED | Noted: 2018-07-26 | Resolved: 2019-07-26

## 2019-07-26 PROBLEM — Z28.39 IMMUNIZATION DEFICIENCY: Status: RESOLVED | Noted: 2018-07-26 | Resolved: 2019-07-26

## 2019-07-26 PROCEDURE — 80061 LIPID PANEL: CPT

## 2019-07-26 PROCEDURE — 85025 COMPLETE CBC W/AUTO DIFF WBC: CPT

## 2019-07-26 PROCEDURE — 99213 PR OFFICE/OUTPT VISIT, EST, LEVL III, 20-29 MIN: ICD-10-PCS | Mod: S$GLB,,, | Performed by: FAMILY MEDICINE

## 2019-07-26 PROCEDURE — 80053 COMPREHEN METABOLIC PANEL: CPT

## 2019-07-26 PROCEDURE — 99213 OFFICE O/P EST LOW 20 MIN: CPT | Mod: S$GLB,,, | Performed by: FAMILY MEDICINE

## 2019-07-26 PROCEDURE — 99999 PR PBB SHADOW E&M-EST. PATIENT-LVL III: CPT | Mod: PBBFAC,,, | Performed by: FAMILY MEDICINE

## 2019-07-26 PROCEDURE — 81003 URINALYSIS AUTO W/O SCOPE: CPT

## 2019-07-26 PROCEDURE — 99999 PR PBB SHADOW E&M-EST. PATIENT-LVL III: ICD-10-PCS | Mod: PBBFAC,,, | Performed by: FAMILY MEDICINE

## 2019-07-26 RX ORDER — LISINOPRIL 10 MG/1
10 TABLET ORAL DAILY
Qty: 30 TABLET | Refills: 0 | Status: SHIPPED | OUTPATIENT
Start: 2019-07-26 | End: 2019-08-28 | Stop reason: SDUPTHER

## 2019-07-26 NOTE — ASSESSMENT & PLAN NOTE
Elevated today. Home bp readings also elevated at times. Not taking lisinopril. Feeling off balance with losartan. ED with higher dose of amlodipine. Advised of bp goal <140/90. Recommend diet and exercise. May take amlodipine and lisinopril at night, check bp in AM. hctz in AM

## 2019-07-26 NOTE — PROGRESS NOTES
Subjective:       Patient ID: Neftaly Sousa is a 54 y.o. male.    Chief Complaint: Medication Dose Change    HPI  Was last seen for htn in 12/2018  Change in meds to hctz25, lisinpril 10 and amlodipine 5mg  Did not start taking lisinopril  Reports ed improving with 1/2 tab amlodipine   Was also on losartan with side effects of feeling off balance before amlodipine-hctz   Reports checking bp at home with 145/90 highest    Reports disturbance in sleep last night d/t stressors and not eating well last week  Review of Systems   Constitutional: Positive for appetite change.   Respiratory: Negative for cough and shortness of breath.    Cardiovascular: Negative for chest pain.   Musculoskeletal: Negative for gait problem.   Neurological: Negative for dizziness and light-headedness.   Psychiatric/Behavioral: Positive for sleep disturbance.        Objective:   BP (!) 149/93   Pulse (!) 52   Temp 98 °F (36.7 °C) (Oral)   Wt (!) 137.6 kg (303 lb 5.7 oz)   SpO2 100%   BMI 36.93 kg/m²     BP Readings from Last 3 Encounters:   07/26/19 (!) 149/93   12/04/18 136/84   09/18/18 (!) 142/84       No results found for: LABA1C, HGBA1C    Physical Exam   Constitutional: He is oriented to person, place, and time. He appears well-developed and well-nourished. No distress.   HENT:   Head: Normocephalic and atraumatic.   Mouth/Throat: Oropharynx is clear and moist.   Eyes: EOM are normal. No scleral icterus.   Neck: Normal range of motion. Neck supple.   Cardiovascular: Normal rate and regular rhythm.   Murmur heard.  Pulmonary/Chest: Effort normal and breath sounds normal. He has no wheezes.   Abdominal: Soft. Bowel sounds are normal. There is no tenderness.   Musculoskeletal: Normal range of motion. He exhibits no edema or deformity.   Neurological: He is alert and oriented to person, place, and time.   Skin: Skin is warm and dry.   Psychiatric: He has a normal mood and affect.   Vitals reviewed.    Assessment:     1. Essential  hypertension      Plan:     Problem List Items Addressed This Visit        Cardiac/Vascular    Essential hypertension - Primary    Current Assessment & Plan     Elevated today. Home bp readings also elevated at times. Not taking lisinopril. Feeling off balance with losartan. ED with higher dose of amlodipine. Advised of bp goal <140/90. Recommend diet and exercise. May take amlodipine and lisinopril at night, check bp in AM. hctz in AM         Relevant Medications    lisinopril 10 MG tablet    Other Relevant Orders    Lipid panel    CBC auto differential    Comprehensive metabolic panel    Urinalysis      reviewed prior labs. Mildly elevated tsh in past and normal t4. Will obtain annual labs. Advised possible adverse side effects with lisinopril.    Follow up in about 4 weeks (around 8/23/2019).

## 2019-07-26 NOTE — PATIENT INSTRUCTIONS
Lisinopril tablets  What is this medicine?  LISINOPRIL (lyse IN oh pril) is an ACE inhibitor. This medicine is used to treat high blood pressure and heart failure. It is also used to protect the heart immediately after a heart attack.  How should I use this medicine?  Take this medicine by mouth with a glass of water. Follow the directions on your prescription label. You may take this medicine with or without food. If it upsets your stomach, take it with food. Take your medicine at regular intervals. Do not take it more often than directed. Do not stop taking except on your doctor's advice.  Talk to your pediatrician regarding the use of this medicine in children. Special care may be needed. While this drug may be prescribed for children as young as 6 years of age for selected conditions, precautions do apply.  What side effects may I notice from receiving this medicine?  Side effects that you should report to your doctor or health care professional as soon as possible:  · allergic reactions like skin rash, itching or hives, swelling of the hands, feet, face, lips, throat, or tongue  · breathing problems  · signs and symptoms of kidney injury like trouble passing urine or change in the amount of urine  · signs and symptoms of increased potassium like muscle weakness; chest pain; or fast, irregular heartbeat  · signs and symptoms of liver injury like dark yellow or brown urine; general ill feeling or flu-like symptoms; light-colored stools; loss of appetite; nausea; right upper belly pain; unusually weak or tired; yellowing of the eyes or skin  · signs and symptoms of low blood pressure like dizziness; feeling faint or lightheaded, falls; unusually weak or tired  · stomach pain with or without nausea and vomiting  Side effects that usually do not require medical attention (report to your doctor or health care professional if they continue or are bothersome):  · changes in  taste  · cough  · dizziness  · fever  · headache  · sensitivity to light  What may interact with this medicine?  Do not take this medicine with any of the following medications:  · hymenoptera venomThis medicines may also interact with the following medications:  · aliskiren  · angiotensin receptor blockers, like losartan or valsartan  · certain medicines for diabetes  · diuretics  · everolimus  · gold compounds  · lithium  · NSAIDs, medicines for pain and inflammation, like ibuprofen or naproxen  · potassium salts or supplements  · salt substitutes  · sirolimus  · temsirolimus  What if I miss a dose?  If you miss a dose, take it as soon as you can. If it is almost time for your next dose, take only that dose. Do not take double or extra doses.  Where should I keep my medicine?  Keep out of the reach of children.  Store at room temperature between 15 and 30 degrees C (59 and 86 degrees F). Protect from moisture. Keep container tightly closed. Throw away any unused medicine after the expiration date.  What should I tell my health care provider before I take this medicine?  They need to know if you have any of these conditions:  · diabetes  · heart or blood vessel disease  · kidney disease  · low blood pressure  · previous swelling of the tongue, face, or lips with difficulty breathing, difficulty swallowing, hoarseness, or tightening of the throat  · an unusual or allergic reaction to lisinopril, other ACE inhibitors, insect venom, foods, dyes, or preservatives  · pregnant or trying to get pregnant  · breast-feeding  What should I watch for while using this medicine?  Visit your doctor or health care professional for regular check ups. Check your blood pressure as directed. Ask your doctor what your blood pressure should be, and when you should contact him or her.  Do not treat yourself for coughs, colds, or pain while you are using this medicine without asking your doctor or health care professional for advice. Some  ingredients may increase your blood pressure.  Women should inform their doctor if they wish to become pregnant or think they might be pregnant. There is a potential for serious side effects to an unborn child. Talk to your health care professional or pharmacist for more information.  Check with your doctor or health care professional if you get an attack of severe diarrhea, nausea and vomiting, or if you sweat a lot. The loss of too much body fluid can make it dangerous for you to take this medicine.  You may get drowsy or dizzy. Do not drive, use machinery, or do anything that needs mental alertness until you know how this drug affects you. Do not stand or sit up quickly, especially if you are an older patient. This reduces the risk of dizzy or fainting spells. Alcohol can make you more drowsy and dizzy. Avoid alcoholic drinks.  Avoid salt substitutes unless you are told otherwise by your doctor or health care professional.  NOTE:This sheet is a summary. It may not cover all possible information. If you have questions about this medicine, talk to your doctor, pharmacist, or health care provider. Copyright© 2017 [employer]

## 2019-07-27 LAB
ALBUMIN SERPL BCP-MCNC: 4.1 G/DL (ref 3.5–5.2)
ALP SERPL-CCNC: 64 U/L (ref 55–135)
ALT SERPL W/O P-5'-P-CCNC: 36 U/L (ref 10–44)
ANION GAP SERPL CALC-SCNC: 7 MMOL/L (ref 8–16)
AST SERPL-CCNC: 31 U/L (ref 10–40)
BASOPHILS # BLD AUTO: 0.05 K/UL (ref 0–0.2)
BASOPHILS NFR BLD: 0.8 % (ref 0–1.9)
BILIRUB SERPL-MCNC: 0.6 MG/DL (ref 0.1–1)
BILIRUB UR QL STRIP: NEGATIVE
BUN SERPL-MCNC: 12 MG/DL (ref 6–20)
CALCIUM SERPL-MCNC: 10.2 MG/DL (ref 8.7–10.5)
CHLORIDE SERPL-SCNC: 99 MMOL/L (ref 95–110)
CHOLEST SERPL-MCNC: 158 MG/DL (ref 120–199)
CHOLEST/HDLC SERPL: 4.1 {RATIO} (ref 2–5)
CLARITY UR REFRACT.AUTO: CLEAR
CO2 SERPL-SCNC: 29 MMOL/L (ref 23–29)
COLOR UR AUTO: YELLOW
CREAT SERPL-MCNC: 1 MG/DL (ref 0.5–1.4)
DIFFERENTIAL METHOD: NORMAL
EOSINOPHIL # BLD AUTO: 0.2 K/UL (ref 0–0.5)
EOSINOPHIL NFR BLD: 3.5 % (ref 0–8)
ERYTHROCYTE [DISTWIDTH] IN BLOOD BY AUTOMATED COUNT: 12.9 % (ref 11.5–14.5)
EST. GFR  (AFRICAN AMERICAN): >60 ML/MIN/1.73 M^2
EST. GFR  (NON AFRICAN AMERICAN): >60 ML/MIN/1.73 M^2
GLUCOSE SERPL-MCNC: 107 MG/DL (ref 70–110)
GLUCOSE UR QL STRIP: NEGATIVE
HCT VFR BLD AUTO: 45.1 % (ref 40–54)
HDLC SERPL-MCNC: 39 MG/DL (ref 40–75)
HDLC SERPL: 24.7 % (ref 20–50)
HGB BLD-MCNC: 15 G/DL (ref 14–18)
HGB UR QL STRIP: NEGATIVE
IMM GRANULOCYTES # BLD AUTO: 0.01 K/UL (ref 0–0.04)
IMM GRANULOCYTES NFR BLD AUTO: 0.2 % (ref 0–0.5)
KETONES UR QL STRIP: NEGATIVE
LDLC SERPL CALC-MCNC: 101.6 MG/DL (ref 63–159)
LEUKOCYTE ESTERASE UR QL STRIP: NEGATIVE
LYMPHOCYTES # BLD AUTO: 2.1 K/UL (ref 1–4.8)
LYMPHOCYTES NFR BLD: 34 % (ref 18–48)
MCH RBC QN AUTO: 29.9 PG (ref 27–31)
MCHC RBC AUTO-ENTMCNC: 33.3 G/DL (ref 32–36)
MCV RBC AUTO: 90 FL (ref 82–98)
MONOCYTES # BLD AUTO: 0.5 K/UL (ref 0.3–1)
MONOCYTES NFR BLD: 7.4 % (ref 4–15)
NEUTROPHILS # BLD AUTO: 3.3 K/UL (ref 1.8–7.7)
NEUTROPHILS NFR BLD: 54.1 % (ref 38–73)
NITRITE UR QL STRIP: NEGATIVE
NONHDLC SERPL-MCNC: 119 MG/DL
NRBC BLD-RTO: 0 /100 WBC
PH UR STRIP: 6 [PH] (ref 5–8)
PLATELET # BLD AUTO: 314 K/UL (ref 150–350)
PMV BLD AUTO: 10.7 FL (ref 9.2–12.9)
POTASSIUM SERPL-SCNC: 3.7 MMOL/L (ref 3.5–5.1)
PROT SERPL-MCNC: 7.9 G/DL (ref 6–8.4)
PROT UR QL STRIP: NEGATIVE
RBC # BLD AUTO: 5.02 M/UL (ref 4.6–6.2)
SODIUM SERPL-SCNC: 135 MMOL/L (ref 136–145)
SP GR UR STRIP: 1.01 (ref 1–1.03)
TRIGL SERPL-MCNC: 87 MG/DL (ref 30–150)
URN SPEC COLLECT METH UR: NORMAL
WBC # BLD AUTO: 6.08 K/UL (ref 3.9–12.7)

## 2019-08-28 DIAGNOSIS — I10 ESSENTIAL HYPERTENSION: ICD-10-CM

## 2019-08-28 RX ORDER — LISINOPRIL 10 MG/1
10 TABLET ORAL DAILY
Qty: 30 TABLET | Refills: 0 | Status: SHIPPED | OUTPATIENT
Start: 2019-08-28 | End: 2021-11-19 | Stop reason: SDUPTHER

## 2019-08-28 RX ORDER — AMLODIPINE BESYLATE 10 MG/1
5 TABLET ORAL DAILY
Qty: 90 TABLET | Refills: 3
Start: 2019-08-28 | End: 2019-09-06

## 2019-08-28 RX ORDER — HYDROCHLOROTHIAZIDE 25 MG/1
25 TABLET ORAL DAILY
Qty: 90 TABLET | Refills: 3 | Status: SHIPPED | OUTPATIENT
Start: 2019-08-28 | End: 2019-09-06 | Stop reason: SDUPTHER

## 2019-08-28 NOTE — TELEPHONE ENCOUNTER
Pt informed medication refills have been sent to requested pharmacy. Pt verbalized understanding of information.

## 2019-08-28 NOTE — TELEPHONE ENCOUNTER
----- Message from Tiffany Wills sent at 8/28/2019 11:14 AM CDT -----  Type:  RX Refill Request    Who Called:   Pt  Neftaly  Refill or New Rx:     Refill   RX Name and Strength:    HCTZ ???????  How is the patient currently taking it? (ex. 1XDay): Takes one time daily  Is this a 30 day or 90 day RX:   30 day  Preferred Pharmacy with phone number:  [facility] at  [phone]  Local or Mail Order:  Local  Ordering Provider:  Dr Chahal  Would the patient rather a call back or a response via [facility]?   Call back  Best Call Back Number:    [phone]  Additional Information:   Please call//[email or web address]

## 2019-09-06 ENCOUNTER — OFFICE VISIT (OUTPATIENT)
Dept: INTERNAL MEDICINE | Facility: CLINIC | Age: 54
End: 2019-09-06
Payer: COMMERCIAL

## 2019-09-06 VITALS
BODY MASS INDEX: 37.74 KG/M2 | WEIGHT: 309.88 LBS | HEART RATE: 49 BPM | SYSTOLIC BLOOD PRESSURE: 146 MMHG | DIASTOLIC BLOOD PRESSURE: 92 MMHG | TEMPERATURE: 97 F | OXYGEN SATURATION: 97 % | HEIGHT: 76 IN

## 2019-09-06 DIAGNOSIS — I10 ESSENTIAL HYPERTENSION: Primary | ICD-10-CM

## 2019-09-06 DIAGNOSIS — R09.81 NASAL CONGESTION: ICD-10-CM

## 2019-09-06 DIAGNOSIS — Z12.12 SCREENING FOR COLORECTAL CANCER: ICD-10-CM

## 2019-09-06 DIAGNOSIS — Z12.11 SCREENING FOR COLORECTAL CANCER: ICD-10-CM

## 2019-09-06 PROCEDURE — 99213 PR OFFICE/OUTPT VISIT, EST, LEVL III, 20-29 MIN: ICD-10-PCS | Mod: S$GLB,,, | Performed by: FAMILY MEDICINE

## 2019-09-06 PROCEDURE — 99999 PR PBB SHADOW E&M-EST. PATIENT-LVL III: ICD-10-PCS | Mod: PBBFAC,,, | Performed by: FAMILY MEDICINE

## 2019-09-06 PROCEDURE — 99213 OFFICE O/P EST LOW 20 MIN: CPT | Mod: S$GLB,,, | Performed by: FAMILY MEDICINE

## 2019-09-06 PROCEDURE — 99999 PR PBB SHADOW E&M-EST. PATIENT-LVL III: CPT | Mod: PBBFAC,,, | Performed by: FAMILY MEDICINE

## 2019-09-06 RX ORDER — HYDROCHLOROTHIAZIDE 25 MG/1
25 TABLET ORAL DAILY
Qty: 90 TABLET | Refills: 3 | Status: SHIPPED | OUTPATIENT
Start: 2019-09-06 | End: 2021-11-19 | Stop reason: SDUPTHER

## 2019-09-06 RX ORDER — FLUTICASONE PROPIONATE 50 MCG
1 SPRAY, SUSPENSION (ML) NASAL DAILY
Qty: 9.9 ML | Refills: 6 | Status: SHIPPED | OUTPATIENT
Start: 2019-09-06 | End: 2021-11-19 | Stop reason: SDUPTHER

## 2019-09-06 RX ORDER — AMLODIPINE BESYLATE 10 MG/1
10 TABLET ORAL DAILY
Qty: 90 TABLET | Refills: 3
Start: 2019-09-06 | End: 2021-11-19 | Stop reason: SDUPTHER

## 2019-09-06 NOTE — ASSESSMENT & PLAN NOTE
Does report working out with squats, pushups, and jumping jacks. Advised of USPSTF recommendation for exercise. Goals for diet and exercise established.

## 2019-09-06 NOTE — PATIENT INSTRUCTIONS
Amlodipine tablets  What is this medicine?  AMLODIPINE (am ANDER valentin belkis) is a calcium-channel blocker. It affects the amount of calcium found in your heart and muscle cells. This relaxes your blood vessels, which can reduce the amount of work the heart has to do. This medicine is used to lower high blood pressure. It is also used to prevent chest pain.  How should I use this medicine?  Take this medicine by mouth with a glass of water. Follow the directions on the prescription label. Take your medicine at regular intervals. Do not take more medicine than directed.  Talk to your pediatrician regarding the use of this medicine in children. Special care may be needed. This medicine has been used in children as young as 6.  Persons over 65 years old may have a stronger reaction to this medicine and need smaller doses.  What side effects may I notice from receiving this medicine?  Side effects that you should report to your doctor or health care professional as soon as possible:  · allergic reactions like skin rash, itching or hives, swelling of the face, lips, or tongue  · breathing problems  · changes in vision or hearing  · chest pain  · fast, irregular heartbeat  · swelling of legs or ankles  Side effects that usually do not require medical attention (report to your doctor or health care professional if they continue or are bothersome):  · dry mouth  · facial flushing  · nausea, vomiting  · stomach gas, pain  · tired, weak  · trouble sleeping  What may interact with this medicine?  · herbal or dietary supplements  · local or general anesthetics  · medicines for high blood pressure  · medicines for prostate problems  · rifampin  What if I miss a dose?  If you miss a dose, take it as soon as you can. If it is almost time for your next dose, take only that dose. Do not take double or extra doses.  Where should I keep my medicine?  Keep out of the reach of children.  Store at room temperature between 59 and 86 degrees F  (15 and 30 degrees C). Protect from light. Keep container tightly closed. Throw away any unused medicine after the expiration date.  What should I tell my health care provider before I take this medicine?  They need to know if you have any of these conditions:  · heart problems like heart failure or aortic stenosis  · liver disease  · an unusual or allergic reaction to amlodipine, other medicines, foods, dyes, or preservatives  · pregnant or trying to get pregnant  · breast-feeding  What should I watch for while using this medicine?  Visit your doctor or health care professional for regular check ups. Check your blood pressure and pulse rate regularly. Ask your health care professional what your blood pressure and pulse rate should be, and when you should contact him or her.  This medicine may make you feel confused, dizzy or lightheaded. Do not drive, use machinery, or do anything that needs mental alertness until you know how this medicine affects you. To reduce the risk of dizzy or fainting spells, do not sit or stand up quickly, especially if you are an older patient. Avoid alcoholic drinks; they can make you more dizzy.  Do not suddenly stop taking amlodipine. Ask your doctor or health care professional how you can gradually reduce the dose.  NOTE:This sheet is a summary. It may not cover all possible information. If you have questions about this medicine, talk to your doctor, pharmacist, or health care provider. Copyright© 2017 [employer]        Hydrochlorothiazide, HCTZ capsules or tablets  What is this medicine?  HYDROCHLOROTHIAZIDE (janneth droe klor oh THYE a zide) is a diuretic. It increases the amount of urine passed, which causes the body to lose salt and water. This medicine is used to treat high blood pressure. It is also reduces the swelling and water retention caused by various medical conditions, such as heart, liver, or kidney disease.  How should I use this medicine?  Take this medicine by mouth  with a glass of water. Follow the directions on the prescription label. Take your medicine at regular intervals. Remember that you will need to pass urine frequently after taking this medicine. Do not take your doses at a time of day that will cause you problems. Do not stop taking your medicine unless your doctor tells you to.  Talk to your pediatrician regarding the use of this medicine in children. Special care may be needed.  What side effects may I notice from receiving this medicine?  Side effects that you should report to your doctor or health care professional as soon as possible:  · allergic reactions such as skin rash or itching, hives, swelling of the lips, mouth, tongue, or throat  · changes in vision  · chest pain  · eye pain  · fast or irregular heartbeat  · feeling faint or lightheaded, falls  · gout attack  · muscle pain or cramps  · pain or difficulty when passing urine  · pain, tingling, numbness in the hands or feet  · redness, blistering, peeling or loosening of the skin, including inside the mouth  · unusually weak or tired  Side effects that usually do not require medical attention (report to your doctor or health care professional if they continue or are bothersome):  · change in sex drive or performance  · dry mouth  · headache  · stomach upset  What may interact with this medicine?  · cholestyramine  · colestipol  · digoxin  · dofetilide  · lithium  · medicines for blood pressure  · medicines for diabetes  · medicines that relax muscles for surgery  · other diuretics  · steroid medicines like prednisone or cortisone  What if I miss a dose?  If you miss a dose, take it as soon as you can. If it is almost time for your next dose, take only that dose. Do not take double or extra doses.  Where should I keep my medicine?  Keep out of the reach of children.  Store at room temperature between 15 and 30 degrees C (59 and 86 degrees F). Do not freeze. Protect from light and moisture. Keep container  closed tightly. Throw away any unused medicine after the expiration date.  What should I tell my health care provider before I take this medicine?  They need to know if you have any of these conditions:  · diabetes  · gout  · immune system problems, like lupus  · kidney disease or kidney stones  · liver disease  · pancreatitis  · small amount of urine or difficulty passing urine  · an unusual or allergic reaction to hydrochlorothiazide, sulfa drugs, other medicines, foods, dyes, or preservatives  · pregnant or trying to get pregnant  · breast-feeding  What should I watch for while using this medicine?  Visit your doctor or health care professional for regular checks on your progress. Check your blood pressure as directed. Ask your doctor or health care professional what your blood pressure should be and when you should contact him or her.  You may need to be on a special diet while taking this medicine. Ask your doctor.  Check with your doctor or health care professional if you get an attack of severe diarrhea, nausea and vomiting, or if you sweat a lot. The loss of too much body fluid can make it dangerous for you to take this medicine.  You may get drowsy or dizzy. Do not drive, use machinery, or do anything that needs mental alertness until you know how this medicine affects you. Do not stand or sit up quickly, especially if you are an older patient. This reduces the risk of dizzy or fainting spells. Alcohol may interfere with the effect of this medicine. Avoid alcoholic drinks.  This medicine may affect your blood sugar level. If you have diabetes, check with your doctor or health care professional before changing the dose of your diabetic medicine.  This medicine can make you more sensitive to the sun. Keep out of the sun. If you cannot avoid being in the sun, wear protective clothing and use sunscreen. Do not use sun lamps or tanning beds/booths.  NOTE:This sheet is a summary. It may not cover all possible  information. If you have questions about this medicine, talk to your doctor, pharmacist, or health care provider. Copyright© 2017 [employer]        Lisinopril tablets  What is this medicine?  LISINOPRIL (lyse IN oh pril) is an ACE inhibitor. This medicine is used to treat high blood pressure and heart failure. It is also used to protect the heart immediately after a heart attack.  How should I use this medicine?  Take this medicine by mouth with a glass of water. Follow the directions on your prescription label. You may take this medicine with or without food. If it upsets your stomach, take it with food. Take your medicine at regular intervals. Do not take it more often than directed. Do not stop taking except on your doctor's advice.  Talk to your pediatrician regarding the use of this medicine in children. Special care may be needed. While this drug may be prescribed for children as young as 6 years of age for selected conditions, precautions do apply.  What side effects may I notice from receiving this medicine?  Side effects that you should report to your doctor or health care professional as soon as possible:  · allergic reactions like skin rash, itching or hives, swelling of the hands, feet, face, lips, throat, or tongue  · breathing problems  · signs and symptoms of kidney injury like trouble passing urine or change in the amount of urine  · signs and symptoms of increased potassium like muscle weakness; chest pain; or fast, irregular heartbeat  · signs and symptoms of liver injury like dark yellow or brown urine; general ill feeling or flu-like symptoms; light-colored stools; loss of appetite; nausea; right upper belly pain; unusually weak or tired; yellowing of the eyes or skin  · signs and symptoms of low blood pressure like dizziness; feeling faint or lightheaded, falls; unusually weak or tired  · stomach pain with or without nausea and vomiting  Side effects that usually do not require medical  attention (report to your doctor or health care professional if they continue or are bothersome):  · changes in taste  · cough  · dizziness  · fever  · headache  · sensitivity to light  What may interact with this medicine?  Do not take this medicine with any of the following medications:  · hymenoptera venomThis medicines may also interact with the following medications:  · aliskiren  · angiotensin receptor blockers, like losartan or valsartan  · certain medicines for diabetes  · diuretics  · everolimus  · gold compounds  · lithium  · NSAIDs, medicines for pain and inflammation, like ibuprofen or naproxen  · potassium salts or supplements  · salt substitutes  · sirolimus  · temsirolimus  What if I miss a dose?  If you miss a dose, take it as soon as you can. If it is almost time for your next dose, take only that dose. Do not take double or extra doses.  Where should I keep my medicine?  Keep out of the reach of children.  Store at room temperature between 15 and 30 degrees C (59 and 86 degrees F). Protect from moisture. Keep container tightly closed. Throw away any unused medicine after the expiration date.  What should I tell my health care provider before I take this medicine?  They need to know if you have any of these conditions:  · diabetes  · heart or blood vessel disease  · kidney disease  · low blood pressure  · previous swelling of the tongue, face, or lips with difficulty breathing, difficulty swallowing, hoarseness, or tightening of the throat  · an unusual or allergic reaction to lisinopril, other ACE inhibitors, insect venom, foods, dyes, or preservatives  · pregnant or trying to get pregnant  · breast-feeding  What should I watch for while using this medicine?  Visit your doctor or health care professional for regular check ups. Check your blood pressure as directed. Ask your doctor what your blood pressure should be, and when you should contact him or her.  Do not treat yourself for coughs, colds,  or pain while you are using this medicine without asking your doctor or health care professional for advice. Some ingredients may increase your blood pressure.  Women should inform their doctor if they wish to become pregnant or think they might be pregnant. There is a potential for serious side effects to an unborn child. Talk to your health care professional or pharmacist for more information.  Check with your doctor or health care professional if you get an attack of severe diarrhea, nausea and vomiting, or if you sweat a lot. The loss of too much body fluid can make it dangerous for you to take this medicine.  You may get drowsy or dizzy. Do not drive, use machinery, or do anything that needs mental alertness until you know how this drug affects you. Do not stand or sit up quickly, especially if you are an older patient. This reduces the risk of dizzy or fainting spells. Alcohol can make you more drowsy and dizzy. Avoid alcoholic drinks.  Avoid salt substitutes unless you are told otherwise by your doctor or health care professional.  NOTE:This sheet is a summary. It may not cover all possible information. If you have questions about this medicine, talk to your doctor, pharmacist, or health care provider. Copyright© 2017 [employer]      Fluticasone nasal spray  What is this medicine?  FLUTICASONE (floo TIK a sone) is a corticosteroid. This medicine is used to treat the symptoms of allergies like sneezing, itchy red eyes, and itchy, runny, or stuffy nose.  How should I use this medicine?  This medicine is for use in the nose. Follow the directions on your product or prescription label. This medicine works best if used at regular intervals. Do not use more often than directed. Make sure that you are using your nasal spray correctly. After 6 months of daily use without a prescription, talk to your doctor or health care professional before using it for a longer time. Ask your doctor or health care professional if  you have any questions.  Talk to your pediatrician regarding the use of this medicine in children. Special care may be needed. This medicine has been used in children as young as 2 years. After two months of daily use without a prescription in a child, talk to your pediatrician before using it for a longer time.  What side effects may I notice from receiving this medicine?  Side effects that you should report to your doctor or health care professional as soon as possible:  · allergic reactions like skin rash, itching or hives, swelling of the face, lips, or tongue  · changes in vision  · flu-like symptoms  · white patches or sores in the mouth or nose  Side effects that usually do not require medical attention (report to your doctor or health care professional if they continue or are bothersome):  · burning or irritation inside the nose or throat  · cough  · headache  · nosebleed  · unusual taste or smell  What may interact with this medicine?  · ketoconazole  · metyrapone  · some medicines for HIV  · vaccines  What if I miss a dose?  If you miss a dose, use it as soon as you remember. If it is almost time for your next dose, use only that dose and continue with your regular schedule. Do not use double or extra doses.  Where should I keep my medicine?  Keep out of the reach of children.  Store at room temperature between 15 and 30 degrees C (59 and 86 degrees F). Throw away any unused medicine after the expiration date.  What should I tell my health care provider before I take this medicine?  They need to know if you have any of these conditions:  · infection, like tuberculosis, herpes, or fungal infection  · recent surgery on nose or sinuses  · taking corticosteroid by mouth  · an unusual or allergic reaction to fluticasone, steroids, other medicines, foods, dyes, or preservatives  · pregnant or trying to get pregnant  · breast-feeding  What should I watch for while using this medicine?  Visit your doctor or health  care professional for regular checks on your progress. Some symptoms may improve within 12 hours after starting use. Check with your doctor or health care professional if there is no improvement in your condition after 3 weeks of use.  Do not come in contact with people who have chickenpox or the measles while you are taking this medicine. If you do, call your doctor right away.  NOTE:This sheet is a summary. It may not cover all possible information. If you have questions about this medicine, talk to your doctor, pharmacist, or health care provider. Copyright© 2017 [employer]        Technique for using nasal spray:  1. Blow nose  2. Lean forward  3. Angle spray outward, using opposite hand to opposite nostril  4. Hold breath and spray  5. Massage nasal fold to retain medicine in nose  6. Avoid blowing nose after administration of medicine    Wrong techniques include:  1. Sitting upright  2. Inhaling quickly when administering medicine  3. Blowing nose after administering medicine  4. Feeling medicine trickle down throat

## 2019-09-06 NOTE — ASSESSMENT & PLAN NOTE
Start taking amlodipine 10mg, lisinopril and hctz in AM. Recheck in one month. Advised of goal <140/90. Reports home readings are 150-138/. Continue monitoring at home.

## 2019-09-06 NOTE — PROGRESS NOTES
"Subjective:       Patient ID: Neftaly Sousa is a 54 y.o. male.    Chief Complaint: Follow-up (medication follow up)    HPI  Here for HTN f/u  Took cold meds this AM tylenol severe cold  Did not take coriciden hbp  Reports compliance to antihypertensives  Taking 1/2 tab amlodipine, lisinopril and hctz in AM  Reports working out with squats, jumping jacks and push ups    Denies [facility]  Desires deja cscope  Never had one performed in past  Review of Systems   Constitutional: Negative for fever.   HENT: Positive for congestion, sinus pressure and sore throat. Negative for trouble swallowing.    Respiratory: Negative for cough.    Neurological: Negative for dizziness, seizures, facial asymmetry, speech difficulty, weakness, light-headedness and headaches.        Objective:   BP (!) 146/92 (BP Location: Right arm, Patient Position: Sitting, BP Method: Large (Manual))   Pulse (!) 49   Temp 97.3 °F (36.3 °C) (Tympanic)   Ht 6' 4" (1.93 m)   Wt (!) 140.5 kg (309 lb 13.7 oz)   SpO2 97%   BMI 37.72 kg/m²     BP Readings from Last 3 Encounters:   09/06/19 (!) 146/92   07/26/19 (!) 149/93   12/04/18 136/84       No results found for: LABA1C, HGBA1C    Physical Exam   Constitutional: He is oriented to person, place, and time. He appears well-developed and well-nourished. No distress.   HENT:   Head: Normocephalic and atraumatic.   Mouth/Throat: Oropharynx is clear and moist.   Eyes: EOM are normal. No scleral icterus.   Neck: Normal range of motion. Neck supple.   Cardiovascular: Normal rate and regular rhythm.   Murmur heard.  Pulmonary/Chest: Effort normal and breath sounds normal. He has no wheezes.   Abdominal: Soft. Bowel sounds are normal. There is no tenderness.   Musculoskeletal: Normal range of motion. He exhibits no edema or deformity.   Neurological: He is alert and oriented to person, place, and time.   Skin: Skin is warm and dry.   Psychiatric: He has a normal mood and affect.   Vitals " reviewed.    Assessment:     1. Essential hypertension    2. Screening for colorectal cancer    3. Adult BMI 37.0-37.9 kg/sq m    4. Nasal congestion      Plan:     Problem List Items Addressed This Visit        Cardiac/Vascular    Essential hypertension - Primary    Current Assessment & Plan     Start taking amlodipine 10mg, lisinopril and hctz in AM. Recheck in one month. Advised of goal <140/90. Reports home readings are 150-138/. Continue monitoring at home.          Relevant Medications    amLODIPine (NORVASC) 10 MG tablet    hydroCHLOROthiazide (HYDRODIURIL) 25 MG tablet       Endocrine    Adult BMI 37.0-37.9 kg/sq m    Current Assessment & Plan     Does report working out with squats, pushups, and jumping jacks. Advised of USPSTF recommendation for exercise. Goals for diet and exercise established.             Other Visit Diagnoses     Screening for colorectal cancer        Relevant Orders    Case request GI: COLONOSCOPY (Completed)    Nasal congestion        Relevant Medications    fluticasone propionate (FLONASE) 50 mcg/actuation nasal spray      Advised correct techinque for nasal spray/flonase      Follow up in about 4 weeks (around 10/4/2019).

## 2019-09-09 ENCOUNTER — TELEPHONE (OUTPATIENT)
Dept: ENDOSCOPY | Facility: HOSPITAL | Age: 54
End: 2019-09-09

## 2019-09-20 ENCOUNTER — PATIENT OUTREACH (OUTPATIENT)
Dept: ADMINISTRATIVE | Facility: HOSPITAL | Age: 54
End: 2019-09-20

## 2020-04-20 ENCOUNTER — TELEPHONE (OUTPATIENT)
Dept: INTERNAL MEDICINE | Facility: CLINIC | Age: 55
End: 2020-04-20

## 2020-04-20 NOTE — TELEPHONE ENCOUNTER
----- Message from Karthik Sadler sent at 4/20/2020 10:02 AM CDT -----  Contact: Patient  Patient called in and wanted to speak with the office regarding scheduling an appointment for an ear infection. He would like a call back from the office and can be reached at    [phone]

## 2021-03-16 ENCOUNTER — PATIENT OUTREACH (OUTPATIENT)
Dept: ADMINISTRATIVE | Facility: HOSPITAL | Age: 56
End: 2021-03-16

## 2021-05-05 ENCOUNTER — PATIENT OUTREACH (OUTPATIENT)
Dept: ADMINISTRATIVE | Facility: HOSPITAL | Age: 56
End: 2021-05-05

## 2021-07-26 ENCOUNTER — PATIENT OUTREACH (OUTPATIENT)
Dept: ADMINISTRATIVE | Facility: HOSPITAL | Age: 56
End: 2021-07-26

## 2021-11-19 ENCOUNTER — LAB VISIT (OUTPATIENT)
Dept: LAB | Facility: HOSPITAL | Age: 56
End: 2021-11-19
Attending: FAMILY MEDICINE
Payer: COMMERCIAL

## 2021-11-19 ENCOUNTER — HOSPITAL ENCOUNTER (OUTPATIENT)
Dept: CARDIOLOGY | Facility: HOSPITAL | Age: 56
Discharge: HOME | End: 2021-11-19
Attending: FAMILY MEDICINE
Payer: COMMERCIAL

## 2021-11-19 ENCOUNTER — OFFICE VISIT (OUTPATIENT)
Dept: INTERNAL MEDICINE | Facility: CLINIC | Age: 56
End: 2021-11-19
Payer: COMMERCIAL

## 2021-11-19 VITALS
BODY MASS INDEX: 34.79 KG/M2 | DIASTOLIC BLOOD PRESSURE: 96 MMHG | WEIGHT: 285.69 LBS | TEMPERATURE: 99 F | HEIGHT: 76 IN | SYSTOLIC BLOOD PRESSURE: 134 MMHG | HEART RATE: 76 BPM

## 2021-11-19 DIAGNOSIS — Z00.00 ROUTINE GENERAL MEDICAL EXAMINATION AT A HEALTH CARE FACILITY: ICD-10-CM

## 2021-11-19 DIAGNOSIS — I10 ESSENTIAL HYPERTENSION: ICD-10-CM

## 2021-11-19 DIAGNOSIS — Z00.00 ROUTINE GENERAL MEDICAL EXAMINATION AT A HEALTH CARE FACILITY: Primary | ICD-10-CM

## 2021-11-19 DIAGNOSIS — Z12.11 ENCOUNTER FOR SCREENING COLONOSCOPY: ICD-10-CM

## 2021-11-19 DIAGNOSIS — N52.9 ERECTILE DYSFUNCTION, UNSPECIFIED ERECTILE DYSFUNCTION TYPE: ICD-10-CM

## 2021-11-19 DIAGNOSIS — R09.81 NASAL CONGESTION: ICD-10-CM

## 2021-11-19 LAB
BASOPHILS # BLD AUTO: 0.06 K/UL (ref 0–0.2)
BASOPHILS NFR BLD: 0.9 % (ref 0–1.9)
DIFFERENTIAL METHOD: NORMAL
EOSINOPHIL # BLD AUTO: 0.4 K/UL (ref 0–0.5)
EOSINOPHIL NFR BLD: 5.3 % (ref 0–8)
ERYTHROCYTE [DISTWIDTH] IN BLOOD BY AUTOMATED COUNT: 13.1 % (ref 11.5–14.5)
HCT VFR BLD AUTO: 43.3 % (ref 40–54)
HGB BLD-MCNC: 14.6 G/DL (ref 14–18)
IMM GRANULOCYTES # BLD AUTO: 0.02 K/UL (ref 0–0.04)
IMM GRANULOCYTES NFR BLD AUTO: 0.3 % (ref 0–0.5)
LYMPHOCYTES # BLD AUTO: 2.3 K/UL (ref 1–4.8)
LYMPHOCYTES NFR BLD: 33.1 % (ref 18–48)
MCH RBC QN AUTO: 29.7 PG (ref 27–31)
MCHC RBC AUTO-ENTMCNC: 33.7 G/DL (ref 32–36)
MCV RBC AUTO: 88 FL (ref 82–98)
MONOCYTES # BLD AUTO: 0.6 K/UL (ref 0.3–1)
MONOCYTES NFR BLD: 8.8 % (ref 4–15)
NEUTROPHILS # BLD AUTO: 3.6 K/UL (ref 1.8–7.7)
NEUTROPHILS NFR BLD: 51.6 % (ref 38–73)
NRBC BLD-RTO: 0 /100 WBC
PLATELET # BLD AUTO: 238 K/UL (ref 150–450)
PMV BLD AUTO: 10.9 FL (ref 9.2–12.9)
RBC # BLD AUTO: 4.92 M/UL (ref 4.6–6.2)
WBC # BLD AUTO: 6.97 K/UL (ref 3.9–12.7)

## 2021-11-19 PROCEDURE — 99396 PR PREVENTIVE VISIT,EST,40-64: ICD-10-PCS | Mod: 25,S$GLB,, | Performed by: FAMILY MEDICINE

## 2021-11-19 PROCEDURE — 3075F PR MOST RECENT SYSTOLIC BLOOD PRESS GE 130-139MM HG: ICD-10-PCS | Mod: CPTII,S$GLB,, | Performed by: FAMILY MEDICINE

## 2021-11-19 PROCEDURE — 93005 ELECTROCARDIOGRAM TRACING: CPT | Mod: PO

## 2021-11-19 PROCEDURE — 84443 ASSAY THYROID STIM HORMONE: CPT | Performed by: FAMILY MEDICINE

## 2021-11-19 PROCEDURE — 90750 HZV VACC RECOMBINANT IM: CPT | Mod: S$GLB,,, | Performed by: FAMILY MEDICINE

## 2021-11-19 PROCEDURE — 80053 COMPREHEN METABOLIC PANEL: CPT | Performed by: FAMILY MEDICINE

## 2021-11-19 PROCEDURE — 99396 PREV VISIT EST AGE 40-64: CPT | Mod: 25,S$GLB,, | Performed by: FAMILY MEDICINE

## 2021-11-19 PROCEDURE — 4010F PR ACE/ARB THEARPY RXD/TAKEN: ICD-10-PCS | Mod: CPTII,S$GLB,, | Performed by: FAMILY MEDICINE

## 2021-11-19 PROCEDURE — 90471 IMMUNIZATION ADMIN: CPT | Mod: S$GLB,,, | Performed by: FAMILY MEDICINE

## 2021-11-19 PROCEDURE — 93010 EKG 12-LEAD: ICD-10-PCS | Mod: ,,, | Performed by: INTERNAL MEDICINE

## 2021-11-19 PROCEDURE — 80061 LIPID PANEL: CPT | Performed by: FAMILY MEDICINE

## 2021-11-19 PROCEDURE — 4010F ACE/ARB THERAPY RXD/TAKEN: CPT | Mod: CPTII,S$GLB,, | Performed by: FAMILY MEDICINE

## 2021-11-19 PROCEDURE — 3008F PR BODY MASS INDEX (BMI) DOCUMENTED: ICD-10-PCS | Mod: CPTII,S$GLB,, | Performed by: FAMILY MEDICINE

## 2021-11-19 PROCEDURE — 87389 HIV-1 AG W/HIV-1&-2 AB AG IA: CPT | Performed by: FAMILY MEDICINE

## 2021-11-19 PROCEDURE — 99999 PR PBB SHADOW E&M-EST. PATIENT-LVL IV: CPT | Mod: PBBFAC,,, | Performed by: FAMILY MEDICINE

## 2021-11-19 PROCEDURE — 36415 COLL VENOUS BLD VENIPUNCTURE: CPT | Mod: PO | Performed by: FAMILY MEDICINE

## 2021-11-19 PROCEDURE — 83036 HEMOGLOBIN GLYCOSYLATED A1C: CPT | Performed by: FAMILY MEDICINE

## 2021-11-19 PROCEDURE — 85025 COMPLETE CBC W/AUTO DIFF WBC: CPT | Performed by: FAMILY MEDICINE

## 2021-11-19 PROCEDURE — 3008F BODY MASS INDEX DOCD: CPT | Mod: CPTII,S$GLB,, | Performed by: FAMILY MEDICINE

## 2021-11-19 PROCEDURE — 82306 VITAMIN D 25 HYDROXY: CPT | Performed by: FAMILY MEDICINE

## 2021-11-19 PROCEDURE — 1159F PR MEDICATION LIST DOCUMENTED IN MEDICAL RECORD: ICD-10-PCS | Mod: CPTII,S$GLB,, | Performed by: FAMILY MEDICINE

## 2021-11-19 PROCEDURE — 3080F PR MOST RECENT DIASTOLIC BLOOD PRESSURE >= 90 MM HG: ICD-10-PCS | Mod: CPTII,S$GLB,, | Performed by: FAMILY MEDICINE

## 2021-11-19 PROCEDURE — 1159F MED LIST DOCD IN RCRD: CPT | Mod: CPTII,S$GLB,, | Performed by: FAMILY MEDICINE

## 2021-11-19 PROCEDURE — 3075F SYST BP GE 130 - 139MM HG: CPT | Mod: CPTII,S$GLB,, | Performed by: FAMILY MEDICINE

## 2021-11-19 PROCEDURE — 3080F DIAST BP >= 90 MM HG: CPT | Mod: CPTII,S$GLB,, | Performed by: FAMILY MEDICINE

## 2021-11-19 PROCEDURE — 90471 ZOSTER RECOMBINANT VACCINE: ICD-10-PCS | Mod: S$GLB,,, | Performed by: FAMILY MEDICINE

## 2021-11-19 PROCEDURE — 99999 PR PBB SHADOW E&M-EST. PATIENT-LVL IV: ICD-10-PCS | Mod: PBBFAC,,, | Performed by: FAMILY MEDICINE

## 2021-11-19 PROCEDURE — 86803 HEPATITIS C AB TEST: CPT | Performed by: FAMILY MEDICINE

## 2021-11-19 PROCEDURE — 84439 ASSAY OF FREE THYROXINE: CPT | Performed by: FAMILY MEDICINE

## 2021-11-19 PROCEDURE — 84153 ASSAY OF PSA TOTAL: CPT | Performed by: FAMILY MEDICINE

## 2021-11-19 PROCEDURE — 93010 ELECTROCARDIOGRAM REPORT: CPT | Mod: ,,, | Performed by: INTERNAL MEDICINE

## 2021-11-19 PROCEDURE — 90750 ZOSTER RECOMBINANT VACCINE: ICD-10-PCS | Mod: S$GLB,,, | Performed by: FAMILY MEDICINE

## 2021-11-19 RX ORDER — TADALAFIL 10 MG/1
10 TABLET ORAL DAILY PRN
Qty: 10 TABLET | Refills: 0 | Status: SHIPPED | OUTPATIENT
Start: 2021-11-19 | End: 2022-09-22 | Stop reason: SDUPTHER

## 2021-11-19 RX ORDER — VITAMIN E ACETATE 125/ML
LIQUID (ML) MISCELLANEOUS DAILY
COMMUNITY

## 2021-11-19 RX ORDER — LISINOPRIL 10 MG/1
10 TABLET ORAL DAILY
Qty: 30 TABLET | Refills: 0 | Status: SHIPPED | OUTPATIENT
Start: 2021-11-19 | End: 2021-12-17 | Stop reason: SDUPTHER

## 2021-11-19 RX ORDER — FLUTICASONE PROPIONATE 50 MCG
1 SPRAY, SUSPENSION (ML) NASAL DAILY
Qty: 9.9 ML | Refills: 6 | Status: SHIPPED | OUTPATIENT
Start: 2021-11-19

## 2021-11-19 RX ORDER — AMLODIPINE BESYLATE 10 MG/1
10 TABLET ORAL DAILY
Qty: 30 TABLET | Refills: 0
Start: 2021-11-19 | End: 2022-01-04 | Stop reason: SDUPTHER

## 2021-11-19 RX ORDER — HYDROCHLOROTHIAZIDE 25 MG/1
25 TABLET ORAL DAILY
Qty: 30 TABLET | Refills: 0 | Status: SHIPPED | OUTPATIENT
Start: 2021-11-19 | End: 2021-12-17 | Stop reason: SDUPTHER

## 2021-11-20 LAB
25(OH)D3+25(OH)D2 SERPL-MCNC: 25 NG/ML (ref 30–96)
ALBUMIN SERPL BCP-MCNC: 4.1 G/DL (ref 3.5–5.2)
ALP SERPL-CCNC: 68 U/L (ref 55–135)
ALT SERPL W/O P-5'-P-CCNC: 30 U/L (ref 10–44)
ANION GAP SERPL CALC-SCNC: 9 MMOL/L (ref 8–16)
AST SERPL-CCNC: 27 U/L (ref 10–40)
BILIRUB SERPL-MCNC: 0.8 MG/DL (ref 0.1–1)
BUN SERPL-MCNC: 12 MG/DL (ref 6–20)
CALCIUM SERPL-MCNC: 9.2 MG/DL (ref 8.7–10.5)
CHLORIDE SERPL-SCNC: 104 MMOL/L (ref 95–110)
CHOLEST SERPL-MCNC: 144 MG/DL (ref 120–199)
CHOLEST/HDLC SERPL: 3.7 {RATIO} (ref 2–5)
CO2 SERPL-SCNC: 27 MMOL/L (ref 23–29)
COMPLEXED PSA SERPL-MCNC: 0.45 NG/ML (ref 0–4)
CREAT SERPL-MCNC: 0.9 MG/DL (ref 0.5–1.4)
EST. GFR  (AFRICAN AMERICAN): >60 ML/MIN/1.73 M^2
EST. GFR  (NON AFRICAN AMERICAN): >60 ML/MIN/1.73 M^2
ESTIMATED AVG GLUCOSE: 105 MG/DL (ref 68–131)
GLUCOSE SERPL-MCNC: 89 MG/DL (ref 70–110)
HBA1C MFR BLD: 5.3 % (ref 4–5.6)
HDLC SERPL-MCNC: 39 MG/DL (ref 40–75)
HDLC SERPL: 27.1 % (ref 20–50)
LDLC SERPL CALC-MCNC: 91.8 MG/DL (ref 63–159)
NONHDLC SERPL-MCNC: 105 MG/DL
POTASSIUM SERPL-SCNC: 4 MMOL/L (ref 3.5–5.1)
PROT SERPL-MCNC: 7.2 G/DL (ref 6–8.4)
SODIUM SERPL-SCNC: 140 MMOL/L (ref 136–145)
T4 FREE SERPL-MCNC: 0.82 NG/DL (ref 0.71–1.51)
TRIGL SERPL-MCNC: 66 MG/DL (ref 30–150)
TSH SERPL DL<=0.005 MIU/L-ACNC: 6.79 UIU/ML (ref 0.4–4)

## 2021-11-22 LAB
HCV AB SERPL QL IA: NEGATIVE
HIV 1+2 AB+HIV1 P24 AG SERPL QL IA: NEGATIVE

## 2021-11-22 NOTE — PROGRESS NOTES
Please call pt with abnormal results. Pt does not need appt at this time, unless they have questions or wish to further discuss.  Low vitamin D level.  Needs to start daily supp of 5000 iu daily otc. Recheck level at next visit.  TSH is elevated, we probably need to get a T4 level at the next visit.  Otherwise labs look good.

## 2021-11-23 ENCOUNTER — TELEPHONE (OUTPATIENT)
Dept: INTERNAL MEDICINE | Facility: CLINIC | Age: 56
End: 2021-11-23
Payer: COMMERCIAL

## 2021-11-26 ENCOUNTER — TELEPHONE (OUTPATIENT)
Dept: INTERNAL MEDICINE | Facility: CLINIC | Age: 56
End: 2021-11-26
Payer: COMMERCIAL

## 2021-11-26 DIAGNOSIS — R94.31 ABNORMAL EKG: Primary | ICD-10-CM

## 2021-12-03 ENCOUNTER — OFFICE VISIT (OUTPATIENT)
Dept: CARDIOLOGY | Facility: CLINIC | Age: 56
End: 2021-12-03
Payer: COMMERCIAL

## 2021-12-03 VITALS
HEART RATE: 45 BPM | RESPIRATION RATE: 16 BRPM | WEIGHT: 285.5 LBS | OXYGEN SATURATION: 98 % | HEIGHT: 76 IN | BODY MASS INDEX: 34.77 KG/M2 | DIASTOLIC BLOOD PRESSURE: 108 MMHG | SYSTOLIC BLOOD PRESSURE: 166 MMHG

## 2021-12-03 DIAGNOSIS — E66.9 OBESITY (BMI 30.0-34.9): ICD-10-CM

## 2021-12-03 DIAGNOSIS — R94.31 ABNORMAL EKG: ICD-10-CM

## 2021-12-03 DIAGNOSIS — R00.1 BRADYCARDIA: ICD-10-CM

## 2021-12-03 DIAGNOSIS — I10 PRIMARY HYPERTENSION: Primary | ICD-10-CM

## 2021-12-03 DIAGNOSIS — I45.4 INTRAVENTRICULAR BLOCK: ICD-10-CM

## 2021-12-03 PROCEDURE — 4010F PR ACE/ARB THEARPY RXD/TAKEN: ICD-10-PCS | Mod: CPTII,S$GLB,, | Performed by: STUDENT IN AN ORGANIZED HEALTH CARE EDUCATION/TRAINING PROGRAM

## 2021-12-03 PROCEDURE — 99999 PR PBB SHADOW E&M-EST. PATIENT-LVL IV: ICD-10-PCS | Mod: PBBFAC,,, | Performed by: STUDENT IN AN ORGANIZED HEALTH CARE EDUCATION/TRAINING PROGRAM

## 2021-12-03 PROCEDURE — 99204 OFFICE O/P NEW MOD 45 MIN: CPT | Mod: S$GLB,,, | Performed by: STUDENT IN AN ORGANIZED HEALTH CARE EDUCATION/TRAINING PROGRAM

## 2021-12-03 PROCEDURE — 4010F ACE/ARB THERAPY RXD/TAKEN: CPT | Mod: CPTII,S$GLB,, | Performed by: STUDENT IN AN ORGANIZED HEALTH CARE EDUCATION/TRAINING PROGRAM

## 2021-12-03 PROCEDURE — 99204 PR OFFICE/OUTPT VISIT, NEW, LEVL IV, 45-59 MIN: ICD-10-PCS | Mod: S$GLB,,, | Performed by: STUDENT IN AN ORGANIZED HEALTH CARE EDUCATION/TRAINING PROGRAM

## 2021-12-03 PROCEDURE — 99999 PR PBB SHADOW E&M-EST. PATIENT-LVL IV: CPT | Mod: PBBFAC,,, | Performed by: STUDENT IN AN ORGANIZED HEALTH CARE EDUCATION/TRAINING PROGRAM

## 2021-12-17 DIAGNOSIS — R09.81 NASAL CONGESTION: ICD-10-CM

## 2021-12-17 DIAGNOSIS — I10 ESSENTIAL HYPERTENSION: ICD-10-CM

## 2021-12-17 RX ORDER — LISINOPRIL 10 MG/1
10 TABLET ORAL DAILY
Qty: 30 TABLET | Refills: 0 | Status: SHIPPED | OUTPATIENT
Start: 2021-12-17 | End: 2022-01-03

## 2021-12-17 RX ORDER — HYDROCHLOROTHIAZIDE 25 MG/1
25 TABLET ORAL DAILY
Qty: 30 TABLET | Refills: 0 | Status: SHIPPED | OUTPATIENT
Start: 2021-12-17 | End: 2021-12-21 | Stop reason: SDUPTHER

## 2021-12-21 DIAGNOSIS — I10 ESSENTIAL HYPERTENSION: ICD-10-CM

## 2021-12-21 RX ORDER — HYDROCHLOROTHIAZIDE 25 MG/1
25 TABLET ORAL DAILY
Qty: 30 TABLET | Refills: 0 | Status: SHIPPED | OUTPATIENT
Start: 2021-12-21 | End: 2022-01-03

## 2022-01-03 ENCOUNTER — OFFICE VISIT (OUTPATIENT)
Dept: INTERNAL MEDICINE | Facility: CLINIC | Age: 57
End: 2022-01-03
Payer: COMMERCIAL

## 2022-01-03 ENCOUNTER — LAB VISIT (OUTPATIENT)
Dept: LAB | Facility: HOSPITAL | Age: 57
End: 2022-01-03
Attending: FAMILY MEDICINE
Payer: COMMERCIAL

## 2022-01-03 VITALS
TEMPERATURE: 98 F | BODY MASS INDEX: 35.54 KG/M2 | WEIGHT: 291.88 LBS | DIASTOLIC BLOOD PRESSURE: 120 MMHG | HEART RATE: 76 BPM | HEIGHT: 76 IN | SYSTOLIC BLOOD PRESSURE: 164 MMHG

## 2022-01-03 DIAGNOSIS — Z12.11 ENCOUNTER FOR SCREENING COLONOSCOPY: ICD-10-CM

## 2022-01-03 DIAGNOSIS — R79.89 ELEVATED TSH: ICD-10-CM

## 2022-01-03 DIAGNOSIS — I10 ESSENTIAL HYPERTENSION: Primary | ICD-10-CM

## 2022-01-03 DIAGNOSIS — N52.9 ERECTILE DYSFUNCTION, UNSPECIFIED ERECTILE DYSFUNCTION TYPE: ICD-10-CM

## 2022-01-03 PROBLEM — R09.81 NASAL CONGESTION: Status: RESOLVED | Noted: 2021-11-19 | Resolved: 2022-01-03

## 2022-01-03 PROBLEM — Z00.00 ROUTINE GENERAL MEDICAL EXAMINATION AT A HEALTH CARE FACILITY: Status: RESOLVED | Noted: 2021-11-19 | Resolved: 2022-01-03

## 2022-01-03 LAB — T4 FREE SERPL-MCNC: 0.81 NG/DL (ref 0.71–1.51)

## 2022-01-03 PROCEDURE — 1159F PR MEDICATION LIST DOCUMENTED IN MEDICAL RECORD: ICD-10-PCS | Mod: CPTII,S$GLB,, | Performed by: FAMILY MEDICINE

## 2022-01-03 PROCEDURE — 3080F PR MOST RECENT DIASTOLIC BLOOD PRESSURE >= 90 MM HG: ICD-10-PCS | Mod: CPTII,S$GLB,, | Performed by: FAMILY MEDICINE

## 2022-01-03 PROCEDURE — 99214 OFFICE O/P EST MOD 30 MIN: CPT | Mod: S$GLB,,, | Performed by: FAMILY MEDICINE

## 2022-01-03 PROCEDURE — 3080F DIAST BP >= 90 MM HG: CPT | Mod: CPTII,S$GLB,, | Performed by: FAMILY MEDICINE

## 2022-01-03 PROCEDURE — 1159F MED LIST DOCD IN RCRD: CPT | Mod: CPTII,S$GLB,, | Performed by: FAMILY MEDICINE

## 2022-01-03 PROCEDURE — 99999 PR PBB SHADOW E&M-EST. PATIENT-LVL III: CPT | Mod: PBBFAC,,, | Performed by: FAMILY MEDICINE

## 2022-01-03 PROCEDURE — 3008F PR BODY MASS INDEX (BMI) DOCUMENTED: ICD-10-PCS | Mod: CPTII,S$GLB,, | Performed by: FAMILY MEDICINE

## 2022-01-03 PROCEDURE — 99214 PR OFFICE/OUTPT VISIT, EST, LEVL IV, 30-39 MIN: ICD-10-PCS | Mod: S$GLB,,, | Performed by: FAMILY MEDICINE

## 2022-01-03 PROCEDURE — 3008F BODY MASS INDEX DOCD: CPT | Mod: CPTII,S$GLB,, | Performed by: FAMILY MEDICINE

## 2022-01-03 PROCEDURE — 99999 PR PBB SHADOW E&M-EST. PATIENT-LVL III: ICD-10-PCS | Mod: PBBFAC,,, | Performed by: FAMILY MEDICINE

## 2022-01-03 PROCEDURE — 36415 COLL VENOUS BLD VENIPUNCTURE: CPT | Mod: PO | Performed by: FAMILY MEDICINE

## 2022-01-03 PROCEDURE — 84439 ASSAY OF FREE THYROXINE: CPT | Performed by: FAMILY MEDICINE

## 2022-01-03 PROCEDURE — 3077F SYST BP >= 140 MM HG: CPT | Mod: CPTII,S$GLB,, | Performed by: FAMILY MEDICINE

## 2022-01-03 PROCEDURE — 3077F PR MOST RECENT SYSTOLIC BLOOD PRESSURE >= 140 MM HG: ICD-10-PCS | Mod: CPTII,S$GLB,, | Performed by: FAMILY MEDICINE

## 2022-01-03 RX ORDER — HYDROCHLOROTHIAZIDE 25 MG/1
TABLET ORAL
COMMUNITY
End: 2022-01-03

## 2022-01-03 RX ORDER — HYDROCHLOROTHIAZIDE 25 MG/1
25 TABLET ORAL DAILY
Qty: 30 TABLET | Refills: 0 | Status: SHIPPED | OUTPATIENT
Start: 2022-01-03 | End: 2022-02-04 | Stop reason: SDUPTHER

## 2022-01-03 RX ORDER — LISINOPRIL 40 MG/1
40 TABLET ORAL DAILY
Qty: 30 TABLET | Refills: 0 | Status: SHIPPED | OUTPATIENT
Start: 2022-01-03 | End: 2022-02-04

## 2022-01-03 NOTE — PROGRESS NOTES
Subjective:       Patient ID: Neftaly Sousa is a 56 y.o. male.    Chief Complaint: Hypertension    Hypertension  This is a chronic problem. The current episode started more than 1 year ago. The problem has been resolved since onset. The problem is controlled. Pertinent negatives include no anxiety, blurred vision, chest pain, headaches or shortness of breath. There are no associated agents to hypertension. Past treatments include calcium channel blockers, diuretics and ACE inhibitors.     Review of Systems   Eyes: Negative for blurred vision.   Respiratory: Negative for shortness of breath.    Cardiovascular: Negative for chest pain.   Gastrointestinal: Negative for abdominal pain.   Neurological: Negative for headaches.       Objective:      Physical Exam  Vitals and nursing note reviewed.   Constitutional:       General: He is not in acute distress.     Appearance: Normal appearance. He is well-developed. He is not diaphoretic.   HENT:      Head: Normocephalic and atraumatic.   Pulmonary:      Effort: Pulmonary effort is normal. No respiratory distress.      Breath sounds: Normal breath sounds. No wheezing.   Skin:     General: Skin is warm and dry.      Findings: No erythema or rash.   Neurological:      Mental Status: He is alert.         Assessment:       1. Essential hypertension    2. Elevated TSH    3. Erectile dysfunction, unspecified erectile dysfunction type    4. Encounter for screening colonoscopy        Plan:     Problem List Items Addressed This Visit        Cardiac/Vascular    Essential hypertension - Primary    Current Assessment & Plan     Chronic, worsening, cont norvasc, hctz.  Increase lisinopril to 40 mg.           Relevant Medications    lisinopriL (PRINIVIL,ZESTRIL) 40 MG tablet    hydroCHLOROthiazide (HYDRODIURIL) 25 MG tablet       Renal/    Erectile dysfunction    Overview     Chronic, Stable, cont cialis            Endocrine    Elevated TSH    Relevant Orders    T4, Free       GI     Encounter for screening colonoscopy    Relevant Orders    Case Request Endoscopy: COLONOSCOPY (Completed)

## 2022-01-04 DIAGNOSIS — I10 ESSENTIAL HYPERTENSION: ICD-10-CM

## 2022-01-04 RX ORDER — AMLODIPINE BESYLATE 10 MG/1
10 TABLET ORAL DAILY
Qty: 30 TABLET | Refills: 0 | Status: SHIPPED | OUTPATIENT
Start: 2022-01-04 | End: 2022-02-04 | Stop reason: SDUPTHER

## 2022-01-04 NOTE — TELEPHONE ENCOUNTER
----- Message from Francois Mohamud MD sent at 1/4/2022 10:42 AM CST -----  Results have been reviewed . All labs are within normal range.   If you have any questions please feel free to contact me.

## 2022-01-26 ENCOUNTER — PATIENT MESSAGE (OUTPATIENT)
Dept: CARDIOLOGY | Facility: HOSPITAL | Age: 57
End: 2022-01-26
Payer: COMMERCIAL

## 2022-02-04 ENCOUNTER — OFFICE VISIT (OUTPATIENT)
Dept: INTERNAL MEDICINE | Facility: CLINIC | Age: 57
End: 2022-02-04
Payer: COMMERCIAL

## 2022-02-04 VITALS
SYSTOLIC BLOOD PRESSURE: 138 MMHG | DIASTOLIC BLOOD PRESSURE: 82 MMHG | BODY MASS INDEX: 35.02 KG/M2 | WEIGHT: 287.69 LBS | HEART RATE: 52 BPM | TEMPERATURE: 98 F

## 2022-02-04 DIAGNOSIS — Z88.8 ALLERGY TO ACE INHIBITORS: ICD-10-CM

## 2022-02-04 DIAGNOSIS — R79.89 ELEVATED TSH: ICD-10-CM

## 2022-02-04 DIAGNOSIS — I10 ESSENTIAL HYPERTENSION: Primary | ICD-10-CM

## 2022-02-04 DIAGNOSIS — T88.7XXA MEDICATION SIDE EFFECTS: ICD-10-CM

## 2022-02-04 PROCEDURE — 3075F PR MOST RECENT SYSTOLIC BLOOD PRESS GE 130-139MM HG: ICD-10-PCS | Mod: CPTII,S$GLB,, | Performed by: FAMILY MEDICINE

## 2022-02-04 PROCEDURE — 3008F PR BODY MASS INDEX (BMI) DOCUMENTED: ICD-10-PCS | Mod: CPTII,S$GLB,, | Performed by: FAMILY MEDICINE

## 2022-02-04 PROCEDURE — 99214 PR OFFICE/OUTPT VISIT, EST, LEVL IV, 30-39 MIN: ICD-10-PCS | Mod: S$GLB,,, | Performed by: FAMILY MEDICINE

## 2022-02-04 PROCEDURE — 3079F PR MOST RECENT DIASTOLIC BLOOD PRESSURE 80-89 MM HG: ICD-10-PCS | Mod: CPTII,S$GLB,, | Performed by: FAMILY MEDICINE

## 2022-02-04 PROCEDURE — 4010F PR ACE/ARB THEARPY RXD/TAKEN: ICD-10-PCS | Mod: CPTII,S$GLB,, | Performed by: FAMILY MEDICINE

## 2022-02-04 PROCEDURE — 99214 OFFICE O/P EST MOD 30 MIN: CPT | Mod: S$GLB,,, | Performed by: FAMILY MEDICINE

## 2022-02-04 PROCEDURE — 3075F SYST BP GE 130 - 139MM HG: CPT | Mod: CPTII,S$GLB,, | Performed by: FAMILY MEDICINE

## 2022-02-04 PROCEDURE — 3079F DIAST BP 80-89 MM HG: CPT | Mod: CPTII,S$GLB,, | Performed by: FAMILY MEDICINE

## 2022-02-04 PROCEDURE — 99999 PR PBB SHADOW E&M-EST. PATIENT-LVL III: CPT | Mod: PBBFAC,,, | Performed by: FAMILY MEDICINE

## 2022-02-04 PROCEDURE — 99999 PR PBB SHADOW E&M-EST. PATIENT-LVL III: ICD-10-PCS | Mod: PBBFAC,,, | Performed by: FAMILY MEDICINE

## 2022-02-04 PROCEDURE — 4010F ACE/ARB THERAPY RXD/TAKEN: CPT | Mod: CPTII,S$GLB,, | Performed by: FAMILY MEDICINE

## 2022-02-04 PROCEDURE — 3008F BODY MASS INDEX DOCD: CPT | Mod: CPTII,S$GLB,, | Performed by: FAMILY MEDICINE

## 2022-02-04 RX ORDER — LISINOPRIL 40 MG/1
TABLET ORAL
COMMUNITY
End: 2022-02-04

## 2022-02-04 RX ORDER — OLMESARTAN MEDOXOMIL 40 MG/1
40 TABLET ORAL DAILY
Qty: 90 TABLET | Refills: 2 | Status: SHIPPED | OUTPATIENT
Start: 2022-02-04 | End: 2022-09-22 | Stop reason: SDUPTHER

## 2022-02-04 RX ORDER — AMLODIPINE BESYLATE 10 MG/1
10 TABLET ORAL DAILY
Qty: 90 TABLET | Refills: 2 | Status: SHIPPED | OUTPATIENT
Start: 2022-02-04 | End: 2022-11-22

## 2022-02-04 RX ORDER — HYDROCHLOROTHIAZIDE 25 MG/1
25 TABLET ORAL DAILY
Qty: 90 TABLET | Refills: 2 | Status: SHIPPED | OUTPATIENT
Start: 2022-02-04 | End: 2022-09-22 | Stop reason: SDUPTHER

## 2022-02-04 NOTE — PROGRESS NOTES
Subjective:       Patient ID: Neftaly Sousa is a 56 y.o. male.    Chief Complaint: Follow-up (1 mo )    Hypertension  This is a chronic problem. The current episode started more than 1 year ago. The problem has been resolved since onset. The problem is controlled. Pertinent negatives include no anxiety, blurred vision, chest pain, headaches or shortness of breath. There are no associated agents to hypertension. Past treatments include ACE inhibitors.     Review of Systems   Eyes: Negative for blurred vision.   Respiratory: Positive for cough. Negative for shortness of breath.    Cardiovascular: Negative for chest pain.   Gastrointestinal: Negative for abdominal pain.   Neurological: Negative for headaches.       Objective:      Physical Exam  Vitals and nursing note reviewed.   Constitutional:       General: He is not in acute distress.     Appearance: Normal appearance. He is well-developed. He is not diaphoretic.   HENT:      Head: Normocephalic and atraumatic.   Pulmonary:      Effort: Pulmonary effort is normal. No respiratory distress.      Breath sounds: Normal breath sounds. No wheezing.   Skin:     General: Skin is warm and dry.      Findings: No erythema or rash.   Neurological:      Mental Status: He is alert.         Assessment:       1. Essential hypertension    2. Allergy to ACE inhibitors    3. Elevated TSH    4. Medication side effects        Plan:     Problem List Items Addressed This Visit        Cardiac/Vascular    Essential hypertension - Primary    Overview     Chronic, Stable           Relevant Medications    amLODIPine (NORVASC) 10 MG tablet    hydroCHLOROthiazide (HYDRODIURIL) 25 MG tablet    olmesartan (BENICAR) 40 MG tablet       Endocrine    Elevated TSH       Other    Medication side effects    Current Assessment & Plan     Pt having cough with lisinopril, change to benicar         Allergy to ACE inhibitors

## 2022-03-29 ENCOUNTER — TELEPHONE (OUTPATIENT)
Dept: ADMINISTRATIVE | Facility: HOSPITAL | Age: 57
End: 2022-03-29
Payer: COMMERCIAL

## 2022-03-29 DIAGNOSIS — Z12.11 COLON CANCER SCREENING: Primary | ICD-10-CM

## 2022-03-29 NOTE — TELEPHONE ENCOUNTER
Case Request for Endo has been canceled and new Referral for Endo Procedure  has been entered for Screening Colonoscopy

## 2022-05-06 ENCOUNTER — HOSPITAL ENCOUNTER (OUTPATIENT)
Dept: PREADMISSION TESTING | Facility: HOSPITAL | Age: 57
Discharge: HOME | End: 2022-05-06
Attending: FAMILY MEDICINE
Payer: COMMERCIAL

## 2022-05-06 ENCOUNTER — TELEPHONE (OUTPATIENT)
Dept: PREADMISSION TESTING | Facility: HOSPITAL | Age: 57
End: 2022-05-06
Payer: COMMERCIAL

## 2022-05-06 DIAGNOSIS — Z12.11 COLON CANCER SCREENING: ICD-10-CM

## 2022-05-06 NOTE — TELEPHONE ENCOUNTER
Spoke with patient, unable to schedule Colonoscopy at this time until Cardiac Echo performed and follow-up for cardiac clearance.  Patient given # to [facility].  New PAT call appointment scheduled.

## 2022-06-10 ENCOUNTER — HOSPITAL ENCOUNTER (OUTPATIENT)
Dept: PREADMISSION TESTING | Facility: HOSPITAL | Age: 57
Discharge: HOME | End: 2022-06-10
Attending: FAMILY MEDICINE
Payer: COMMERCIAL

## 2022-06-10 ENCOUNTER — TELEPHONE (OUTPATIENT)
Dept: INTERNAL MEDICINE | Facility: CLINIC | Age: 57
End: 2022-06-10
Payer: COMMERCIAL

## 2022-06-10 DIAGNOSIS — Z12.11 COLON CANCER SCREENING: Primary | ICD-10-CM

## 2022-06-10 RX ORDER — SODIUM, POTASSIUM,MAG SULFATES 17.5-3.13G
1 SOLUTION, RECONSTITUTED, ORAL ORAL DAILY
Qty: 1 KIT | Refills: 0 | Status: SHIPPED | OUTPATIENT
Start: 2022-06-10 | End: 2022-06-12

## 2022-06-10 NOTE — TELEPHONE ENCOUNTER
----- Message from Kalli Gilman sent at 6/10/2022  2:34 PM CDT -----  Type:  RX Refill Request    Who Called: pt  Refill or New Rx: refill  RX Name and Strength:tadalafiL   How is the patient currently taking it? (ex. 1XDay):?  Is this a 30 day or 90 day RX:?  Preferred Pharmacy with phone number:.  [address] [address] [facility]  [address] [facility] [address]  Phone: [phone] Fax: [phone]  Local or Mail Order:local  Ordering Provider:Dr Mohamud  Would the patient rather a call back or a response via [facility]? call  Best Call Back Number:[phone]   Additional Information: #    Thank you

## 2022-07-13 ENCOUNTER — TELEPHONE (OUTPATIENT)
Dept: PREADMISSION TESTING | Facility: HOSPITAL | Age: 57
End: 2022-07-13

## 2022-07-13 DIAGNOSIS — Z12.11 COLON CANCER SCREENING: Primary | ICD-10-CM

## 2022-07-13 NOTE — TELEPHONE ENCOUNTER
Patient has not had ECHO done yet, pt would like to cancel colonoscopy. Advised pt to have ECHO done first then call back to schedule Colonoscopy. Pt verbalized understanding.

## 2022-07-13 NOTE — TELEPHONE ENCOUNTER
----- Message from Denisse Menendez sent at 7/13/2022  9:38 AM CDT -----  Contact: self/[phone]  Patient is calling to cancel his colonoscopy and will call back to reschedule. Thanks/ar

## 2022-07-13 NOTE — TELEPHONE ENCOUNTER
----- Message from Grabiel Hopper MD sent at 7/12/2022  5:57 PM CDT -----  Regarding: RE: Cardiac Clearance  I will not be able to clear him as I have not seen him since last december. He was to follow up a month later, but he did not. He will need to be seen in clinic to have cardiac clearance. We will reach out to him for follow up visit, hopefully, prior to his colonoscopy friday    Dr. Hopper   ----- Message -----  From: Saturnino Cruz RN  Sent: 7/12/2022   2:35 PM CDT  To: Grabiel Hopper MD  Subject: Cardiac Clearance                                Good afternoon, Mr. Sousa is scheduled for a Colonoscopy on Friday, July 15, 2022. Before proceeding we will need cardiac clearance, please advise, thank you.

## 2022-09-22 DIAGNOSIS — I10 ESSENTIAL HYPERTENSION: ICD-10-CM

## 2022-09-22 DIAGNOSIS — N52.9 ERECTILE DYSFUNCTION, UNSPECIFIED ERECTILE DYSFUNCTION TYPE: ICD-10-CM

## 2022-09-22 RX ORDER — HYDROCHLOROTHIAZIDE 25 MG/1
25 TABLET ORAL DAILY
Qty: 90 TABLET | Refills: 0 | Status: SHIPPED | OUTPATIENT
Start: 2022-09-22 | End: 2022-12-30 | Stop reason: SDUPTHER

## 2022-09-22 RX ORDER — TADALAFIL 10 MG/1
10 TABLET ORAL DAILY PRN
Qty: 30 TABLET | Refills: 0 | Status: SHIPPED | OUTPATIENT
Start: 2022-09-22 | End: 2023-06-19 | Stop reason: SDUPTHER

## 2022-09-22 RX ORDER — OLMESARTAN MEDOXOMIL 40 MG/1
40 TABLET ORAL DAILY
Qty: 90 TABLET | Refills: 0 | Status: SHIPPED | OUTPATIENT
Start: 2022-09-22 | End: 2024-03-19

## 2022-09-22 NOTE — TELEPHONE ENCOUNTER
----- Message from Kavya Sandoval sent at 9/22/2022  9:49 AM CDT -----  Contact: Neftaly  Type:  RX Refill Request    Who Called: Neftaly  Refill or New Rx:Refill  RX Name and Strength:olmesartan (BENICAR) 40 MG table  How is the patient currently taking it? (ex. 1XDay):once a day  Is this a 30 day or 90 day RX:90  Preferred Pharmacy with phone number:  [address] [address] [place] [address]  Phone: [phone] Fax: [phone]   Local or Mail Order:  Ordering Provider:   Would the patient rather a call back or a response via [facility]? Call back   Best Call Back Number:[phone]  Additional Information:        Type:  RX Refill Request    Who Called: Neftaly  Refill or New Rx:Refill  RX Name and Strength:hydroCHLOROthiazide (HYDRODIURIL) 25 MG tablet  How is the patient currently taking it? (ex. 1XDay):once a day  Is this a 30 day or 90 day RX:90 day   Preferred Pharmacy with phone number:  [address] [address] [address] [address]  Phone: [phone] Fax: [phone]   Local or Mail Order:  Ordering Provider:   Would the patient rather a call back or a response via [facility]? Call back   Best Call Back Number:[phone]  Additional Information:        Type:  RX Refill Request    Who Called: Neftaly  Refill or New Rx:Refill  RX Name and Strength:tadalafiL (CIALIS) 10 MG table  How is the patient currently taking it? (ex. 1XDay): as needed   Is this a 30 day or 90 day RX:   Preferred Pharmacy with phone number:  [address] - [address] [facility]  [id number] [place] [address]  Phone: [phone] Fax: [phone]   Local or Mail Order:  Ordering Provider:   Would the patient rather a call back or a response via [facility]? Call back   Best Call Back Number:[phone]  Additional  Information:

## 2022-11-21 DIAGNOSIS — R94.31 ABNORMAL EKG: ICD-10-CM

## 2022-11-21 DIAGNOSIS — I10 ESSENTIAL HYPERTENSION: Primary | ICD-10-CM

## 2022-11-22 ENCOUNTER — HOSPITAL ENCOUNTER (OUTPATIENT)
Dept: CARDIOLOGY | Facility: HOSPITAL | Age: 57
Discharge: HOME | End: 2022-11-22
Attending: STUDENT IN AN ORGANIZED HEALTH CARE EDUCATION/TRAINING PROGRAM
Payer: COMMERCIAL

## 2022-11-22 ENCOUNTER — OFFICE VISIT (OUTPATIENT)
Dept: CARDIOLOGY | Facility: CLINIC | Age: 57
End: 2022-11-22
Payer: COMMERCIAL

## 2022-11-22 ENCOUNTER — OFFICE VISIT (OUTPATIENT)
Dept: INTERNAL MEDICINE | Facility: CLINIC | Age: 57
End: 2022-11-22
Payer: COMMERCIAL

## 2022-11-22 VITALS
SYSTOLIC BLOOD PRESSURE: 138 MMHG | HEIGHT: 76 IN | DIASTOLIC BLOOD PRESSURE: 84 MMHG | WEIGHT: 292.56 LBS | TEMPERATURE: 98 F | BODY MASS INDEX: 35.63 KG/M2 | HEART RATE: 56 BPM

## 2022-11-22 VITALS
WEIGHT: 292.13 LBS | HEIGHT: 76 IN | BODY MASS INDEX: 35.57 KG/M2 | OXYGEN SATURATION: 98 % | DIASTOLIC BLOOD PRESSURE: 82 MMHG | SYSTOLIC BLOOD PRESSURE: 140 MMHG

## 2022-11-22 DIAGNOSIS — R94.31 ABNORMAL EKG: Primary | ICD-10-CM

## 2022-11-22 DIAGNOSIS — E66.01 SEVERE OBESITY: ICD-10-CM

## 2022-11-22 DIAGNOSIS — N52.9 ERECTILE DYSFUNCTION, UNSPECIFIED ERECTILE DYSFUNCTION TYPE: ICD-10-CM

## 2022-11-22 DIAGNOSIS — Z12.11 ENCOUNTER FOR SCREENING COLONOSCOPY: ICD-10-CM

## 2022-11-22 DIAGNOSIS — R94.31 ABNORMAL EKG: ICD-10-CM

## 2022-11-22 DIAGNOSIS — Z00.00 WELLNESS EXAMINATION: Primary | ICD-10-CM

## 2022-11-22 DIAGNOSIS — I10 ESSENTIAL HYPERTENSION: ICD-10-CM

## 2022-11-22 DIAGNOSIS — Z87.828 HISTORY OF SUBDURAL HEMATOMA (POST TRAUMATIC): ICD-10-CM

## 2022-11-22 DIAGNOSIS — I83.93 VARICOSE VEINS OF BOTH LOWER EXTREMITIES, UNSPECIFIED WHETHER COMPLICATED: ICD-10-CM

## 2022-11-22 PROCEDURE — 90750 HZV VACC RECOMBINANT IM: CPT | Mod: S$GLB,,, | Performed by: FAMILY MEDICINE

## 2022-11-22 PROCEDURE — 93010 ELECTROCARDIOGRAM REPORT: CPT | Mod: ,,, | Performed by: INTERNAL MEDICINE

## 2022-11-22 PROCEDURE — 99999 PR PBB SHADOW E&M-EST. PATIENT-LVL IV: ICD-10-PCS | Mod: PBBFAC,,, | Performed by: STUDENT IN AN ORGANIZED HEALTH CARE EDUCATION/TRAINING PROGRAM

## 2022-11-22 PROCEDURE — 1159F PR MEDICATION LIST DOCUMENTED IN MEDICAL RECORD: ICD-10-PCS | Mod: CPTII,S$GLB,, | Performed by: FAMILY MEDICINE

## 2022-11-22 PROCEDURE — 4010F PR ACE/ARB THEARPY RXD/TAKEN: ICD-10-PCS | Mod: CPTII,S$GLB,, | Performed by: STUDENT IN AN ORGANIZED HEALTH CARE EDUCATION/TRAINING PROGRAM

## 2022-11-22 PROCEDURE — 3075F PR MOST RECENT SYSTOLIC BLOOD PRESS GE 130-139MM HG: ICD-10-PCS | Mod: CPTII,S$GLB,, | Performed by: FAMILY MEDICINE

## 2022-11-22 PROCEDURE — 99999 PR PBB SHADOW E&M-EST. PATIENT-LVL IV: CPT | Mod: PBBFAC,,, | Performed by: FAMILY MEDICINE

## 2022-11-22 PROCEDURE — 90471 IMMUNIZATION ADMIN: CPT | Mod: S$GLB,,, | Performed by: FAMILY MEDICINE

## 2022-11-22 PROCEDURE — 3077F SYST BP >= 140 MM HG: CPT | Mod: CPTII,S$GLB,, | Performed by: STUDENT IN AN ORGANIZED HEALTH CARE EDUCATION/TRAINING PROGRAM

## 2022-11-22 PROCEDURE — 3079F PR MOST RECENT DIASTOLIC BLOOD PRESSURE 80-89 MM HG: ICD-10-PCS | Mod: CPTII,S$GLB,, | Performed by: STUDENT IN AN ORGANIZED HEALTH CARE EDUCATION/TRAINING PROGRAM

## 2022-11-22 PROCEDURE — 1159F MED LIST DOCD IN RCRD: CPT | Mod: CPTII,S$GLB,, | Performed by: FAMILY MEDICINE

## 2022-11-22 PROCEDURE — 90471 ZOSTER RECOMBINANT VACCINE: ICD-10-PCS | Mod: S$GLB,,, | Performed by: FAMILY MEDICINE

## 2022-11-22 PROCEDURE — 3008F BODY MASS INDEX DOCD: CPT | Mod: CPTII,S$GLB,, | Performed by: FAMILY MEDICINE

## 2022-11-22 PROCEDURE — 4010F ACE/ARB THERAPY RXD/TAKEN: CPT | Mod: CPTII,S$GLB,, | Performed by: FAMILY MEDICINE

## 2022-11-22 PROCEDURE — 90750 ZOSTER RECOMBINANT VACCINE: ICD-10-PCS | Mod: S$GLB,,, | Performed by: FAMILY MEDICINE

## 2022-11-22 PROCEDURE — 99396 PR PREVENTIVE VISIT,EST,40-64: ICD-10-PCS | Mod: 25,S$GLB,, | Performed by: FAMILY MEDICINE

## 2022-11-22 PROCEDURE — 3077F PR MOST RECENT SYSTOLIC BLOOD PRESSURE >= 140 MM HG: ICD-10-PCS | Mod: CPTII,S$GLB,, | Performed by: STUDENT IN AN ORGANIZED HEALTH CARE EDUCATION/TRAINING PROGRAM

## 2022-11-22 PROCEDURE — 4010F PR ACE/ARB THEARPY RXD/TAKEN: ICD-10-PCS | Mod: CPTII,S$GLB,, | Performed by: FAMILY MEDICINE

## 2022-11-22 PROCEDURE — 93010 EKG 12-LEAD: ICD-10-PCS | Mod: ,,, | Performed by: INTERNAL MEDICINE

## 2022-11-22 PROCEDURE — 3075F SYST BP GE 130 - 139MM HG: CPT | Mod: CPTII,S$GLB,, | Performed by: FAMILY MEDICINE

## 2022-11-22 PROCEDURE — 1159F MED LIST DOCD IN RCRD: CPT | Mod: CPTII,S$GLB,, | Performed by: STUDENT IN AN ORGANIZED HEALTH CARE EDUCATION/TRAINING PROGRAM

## 2022-11-22 PROCEDURE — 93005 ELECTROCARDIOGRAM TRACING: CPT | Mod: PO

## 2022-11-22 PROCEDURE — 1159F PR MEDICATION LIST DOCUMENTED IN MEDICAL RECORD: ICD-10-PCS | Mod: CPTII,S$GLB,, | Performed by: STUDENT IN AN ORGANIZED HEALTH CARE EDUCATION/TRAINING PROGRAM

## 2022-11-22 PROCEDURE — 3079F DIAST BP 80-89 MM HG: CPT | Mod: CPTII,S$GLB,, | Performed by: STUDENT IN AN ORGANIZED HEALTH CARE EDUCATION/TRAINING PROGRAM

## 2022-11-22 PROCEDURE — 3008F BODY MASS INDEX DOCD: CPT | Mod: CPTII,S$GLB,, | Performed by: STUDENT IN AN ORGANIZED HEALTH CARE EDUCATION/TRAINING PROGRAM

## 2022-11-22 PROCEDURE — 99214 OFFICE O/P EST MOD 30 MIN: CPT | Mod: S$GLB,,, | Performed by: STUDENT IN AN ORGANIZED HEALTH CARE EDUCATION/TRAINING PROGRAM

## 2022-11-22 PROCEDURE — 99999 PR PBB SHADOW E&M-EST. PATIENT-LVL IV: ICD-10-PCS | Mod: PBBFAC,,, | Performed by: FAMILY MEDICINE

## 2022-11-22 PROCEDURE — 4010F ACE/ARB THERAPY RXD/TAKEN: CPT | Mod: CPTII,S$GLB,, | Performed by: STUDENT IN AN ORGANIZED HEALTH CARE EDUCATION/TRAINING PROGRAM

## 2022-11-22 PROCEDURE — 99214 PR OFFICE/OUTPT VISIT, EST, LEVL IV, 30-39 MIN: ICD-10-PCS | Mod: S$GLB,,, | Performed by: STUDENT IN AN ORGANIZED HEALTH CARE EDUCATION/TRAINING PROGRAM

## 2022-11-22 PROCEDURE — 99999 PR PBB SHADOW E&M-EST. PATIENT-LVL IV: CPT | Mod: PBBFAC,,, | Performed by: STUDENT IN AN ORGANIZED HEALTH CARE EDUCATION/TRAINING PROGRAM

## 2022-11-22 PROCEDURE — 99396 PREV VISIT EST AGE 40-64: CPT | Mod: 25,S$GLB,, | Performed by: FAMILY MEDICINE

## 2022-11-22 PROCEDURE — 3008F PR BODY MASS INDEX (BMI) DOCUMENTED: ICD-10-PCS | Mod: CPTII,S$GLB,, | Performed by: FAMILY MEDICINE

## 2022-11-22 PROCEDURE — 3008F PR BODY MASS INDEX (BMI) DOCUMENTED: ICD-10-PCS | Mod: CPTII,S$GLB,, | Performed by: STUDENT IN AN ORGANIZED HEALTH CARE EDUCATION/TRAINING PROGRAM

## 2022-11-22 PROCEDURE — 3079F DIAST BP 80-89 MM HG: CPT | Mod: CPTII,S$GLB,, | Performed by: FAMILY MEDICINE

## 2022-11-22 PROCEDURE — 3079F PR MOST RECENT DIASTOLIC BLOOD PRESSURE 80-89 MM HG: ICD-10-PCS | Mod: CPTII,S$GLB,, | Performed by: FAMILY MEDICINE

## 2022-11-22 RX ORDER — NIFEDIPINE 60 MG/1
60 TABLET, EXTENDED RELEASE ORAL DAILY
Qty: 30 TABLET | Refills: 11 | Status: SHIPPED | OUTPATIENT
Start: 2022-11-22 | End: 2024-03-19 | Stop reason: SDUPTHER

## 2022-11-22 NOTE — PROGRESS NOTES
Section of Cardiology                  Cardiac Clinic Note    Chief Complaint/Reason for consultation: abnormal EKG       HPI:   Neftaly Sousa is a 57 y.o. male with h/o obesity, hypertension, subdural hematoma who was referred to cardiology clinic by PCP for evaluation of abnormal EKG.       12/3/21  He's been doing well overall. Denies chest pain or SOB. Reports exercising 5 times a week for about 20 minutes, squats, leg raises, weight training. 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 Denies tobacco or ETOH abuse. Denies cardiac history. Diet: eats bread a good bit, reduced sugary food intake, red meats frequently, 2 days out of the week does not eat meat, no sodas, eats fried foods during the week.     Denies orthopnea, dizziness, PND, syncope, palpitations.     Family history: dad- lung cancer, mom- stroke      11/22/22  Did not get echo done  BP elevated today, takes his meds  Says BP ranges 130s at home   Has gained 5 lbs since 2/22  Does not exercise regularly, has treadmill at home  Does not eat much fried foods or sweets  Eats lots of carbs       Denies SOB, chest pain, LH, syncope, palpitations         EKG 11/22/22 SB, LAD, LVH with QRS widening   EKG 11/19/21 marked sinus bradycardia, LAD, LVH with QRS widening, 43 bpm,  ms, QTc 403 ms (stable from prior EKGs)    ECHO      STRESS TEST    [facility]      ROS: All 10 systems reviewed. Please refer to the HPI for pertinent positives. All other systems negative.     Past Medical History  Past Medical History:   Diagnosis Date    History of seizures as a child 1966 until 1977    Secondary to subdural hematoma    History of subdural hematoma (post traumatic) 1966    Hypertension 2010    Nasal congestion 11/19/2021    Routine general medical examination at a health care facility 11/19/2021       Surgical History  Past Surgical History:   Procedure Laterality Date    NESHA HOLE FOR SUBDURAL HEMATOMA  1966           Allergies:   Review of patient's allergies indicates:  No Known Allergies    Social History:  Social History     Socioeconomic History    Marital status:     Number of children: 3   Occupational History    Occupation:      Comment: [employer]   Tobacco Use    Smoking status: Never    Smokeless tobacco: Never   Substance and Sexual Activity    Alcohol use: No     Alcohol/week: 0.0 standard drinks    Drug use: No    Sexual activity: Yes     Partners: Female       Family History:  family history includes Cancer in his brother and father; Diabetes in his brother, brother, and sister; Hypertension in his sister; Lung cancer in his father; Stroke in his mother; Throat cancer in his brother.    Home Medications:  Current Outpatient Medications on File Prior to Visit   Medication Sig Dispense Refill    fluticasone propionate (FLONASE) 50 mcg/actuation nasal spray 1 spray (50 mcg total) by Each Nostril route once daily. May one spray per nostril twice daily, MAX 2 sprays/nostril/day 9.9 mL 6    hydroCHLOROthiazide (HYDRODIURIL) 25 MG tablet Take 1 tablet (25 mg total) by mouth once daily. 90 tablet 0    KAN EXTRACT ORAL Take 1 tablet by mouth once daily.      MAGNESIUM CITRATE ORAL Take 160 mg by mouth once daily.      multivitamin (MULTIPLE VITAMINS ORAL) Take 1 tablet by mouth once daily.      olmesartan (BENICAR) 40 MG tablet Take 1 tablet (40 mg total) by mouth once daily. 90 tablet 0    pseudoeph/DM/guaifen/acetamin (TYLENOL COLD MULTI-SYMPTOM ORAL) Take by mouth.      tadalafiL (CIALIS) 10 MG tablet Take 1 tablet (10 mg total) by mouth daily as needed for Erectile Dysfunction. 30 tablet 0    vitamin E acetate, bulk, 125 unit/mL Liqd by Misc.(Non-Drug; Combo Route) route once daily.      [DISCONTINUED] amLODIPine (NORVASC) 10 MG tablet Take 1 tablet (10 mg total) by mouth once daily. 90 tablet 2    UNABLE TO FIND Primal Multivitamin      [DISCONTINUED] UNABLE TO FIND medication name: Sea  "Whitney daily       No current facility-administered medications on file prior to visit.       Physical exam:  BP (!) 140/82 (BP Location: Left arm, Patient Position: Sitting, BP Method: Medium (Manual))   Ht 6' 4" (1.93 m)   Wt 132.5 kg (292 lb 1.8 oz)   SpO2 98%   BMI 35.56 kg/m²         General: Pt is a 57 y.o. year old male who is AAOx3, in NAD, is pleasant, well nourished, looks stated age  HEENT: PERRL, EOMI, Oral mucosa pink & moist  CVS  No abnormal cardiac pulsations noted on inspection. JVP not raised. The apical impulse is normal on palpation, and is located in the left 5th intercostal space in the mid - clavicular line. No palpable thrills or abnormal pulsations noted. RR, S1 - S2 heard, no murmurs, rubs or gallops appreciated.   PUL : CTA B/L. No wheezes/crackles heard   ABD : BS +, soft. No tenderness elicited   LE : No C/C/E. Distal Pulses palpable B/L         LABS:    Chemistry:   Lab Results   Component Value Date     11/19/2021    K 4.0 11/19/2021     11/19/2021    CO2 27 11/19/2021    BUN 12 11/19/2021    CREATININE 0.9 11/19/2021    CALCIUM 9.2 11/19/2021     Cardiac Markers: No results found for: CKTOTAL, CKMB, CKMBINDEX, TROPONINI  Cardiac Markers (Last 3): No results found for: CKTOTAL, CKMB, CKMBINDEX, TROPONINI  CBC:   Lab Results   Component Value Date    WBC 6.97 11/19/2021    HGB 14.6 11/19/2021    HCT 43.3 11/19/2021    MCV 88 11/19/2021     11/19/2021     Lipids:   Lab Results   Component Value Date    CHOL 144 11/19/2021    TRIG 66 11/19/2021    HDL 39 (L) 11/19/2021     Coagulation: No results found for: PT, INR, APTT        Assessment        1. Abnormal EKG    2. Essential hypertension    3. History of subdural hematoma (post traumatic)    4. Erectile dysfunction, unspecified erectile dysfunction type    5. Severe obesity           Plan:    Abnormal EKG   EKG with intraventricular block, bradycardia, LVH  Will re-obtain echocardiogram for evaluation for any " structural abnormalities    H/o bradycardia  Denies any symptoms of dizziness, syncope, shortness of breath  Avoid any AV neo blocking agents    Hypertension  Elevated  Switch amlodipine to nifedipine 60  Continue benicar and hctz    Obesity, BMI 35-39.9  Exercise as tolerated, at least 30 minutes daily  Discussed low-salt, low-fat diet.  Dash diet        This note was prepared using voice recognition system and is likely to have sound alike errors that may have been overlooked even after proofreading.     I have reviewed all pertinent chart information.  Plans and recommendations have been formulated under my direct supervision. All questions answered and patient voiced understanding.     RTC in 2 months (can't be seen in December per patient due to work)        Grabiel Hopper MD  Cardiology

## 2022-11-22 NOTE — PROGRESS NOTES
Subjective:       Patient ID: Neftaly Sousa is a 57 y.o. male.    Chief Complaint: Annual Exam    Neftaly Sousa is a 57 y.o. male and is here for a comprehensive physical exam.    Do you take any herbs or supplements that were not prescribed by a doctor? Multi-vit  Are you taking calcium supplements? no  Are you taking aspirin daily? no     History:  Any STD's in the past? none    The following portions of the patient's history were reviewed and updated as appropriate: allergies, current medications, past family history, past medical history, past social history, past surgical history and problem list.    Review of Systems  Do you have pain that bothers you in your daily life? no  Pertinent items are noted in HPI.      2. Patient Counseling:  --Nutrition: Stressed importance of moderation in sodium/caffeine intake, saturated fat and cholesterol, caloric balance.  --Exercise: Stressed the importance of regular exercise.   --Substance Abuse: Discussed cessation/primary prevention of tobacco, alcohol - nonuser.   --Sexuality: Discussed sexually transmitted disease.  --Injury prevention: Discussed safety belts, smoke detector.   --Dental health: Discussed dental health.  --Immunizations reviewed.    3. Discussed the patient's BMI.  4. Follow up as needed for acute illness        Review of Systems   Constitutional:  Negative for activity change.   HENT:  Negative for ear pain.    Eyes:  Negative for pain.   Respiratory:  Negative for shortness of breath.    Cardiovascular:  Negative for chest pain.   Gastrointestinal:  Negative for abdominal pain.   Genitourinary:  Negative for dysuria.   Musculoskeletal:  Negative for neck pain.   Skin:  Negative for rash.   Neurological:  Negative for headaches.     Objective:      Physical Exam  Vitals and nursing note reviewed.   Constitutional:       General: He is not in acute distress.     Appearance: Normal appearance. He is well-developed. He is not diaphoretic.    HENT:      Head: Normocephalic and atraumatic.   Cardiovascular:      Rate and Rhythm: Normal rate and regular rhythm.   Pulmonary:      Effort: Pulmonary effort is normal. No respiratory distress.      Breath sounds: Normal breath sounds. No wheezing.   Abdominal:      General: There is no distension.      Palpations: Abdomen is soft.      Tenderness: There is no abdominal tenderness. There is no guarding.   Musculoskeletal:      Right lower leg: No edema.      Left lower leg: No edema.      Comments: Varicose veins   Skin:     General: Skin is warm and dry.      Findings: No erythema or rash.   Neurological:      Mental Status: He is alert. Mental status is at baseline.   Psychiatric:         Mood and Affect: Mood normal.         Thought Content: Thought content normal.       Assessment:       1. Wellness examination    2. Encounter for screening colonoscopy    3. Varicose veins of both lower extremities, unspecified whether complicated          Plan:     Problem List Items Addressed This Visit          Cardiac/Vascular    Varicose veins of both lower extremities       GI    Encounter for screening colonoscopy    Relevant Orders    Cologuard Screening (Multitarget Stool DNA)       Other    Wellness examination - Primary    Relevant Orders    CBC Auto Differential    Comprehensive Metabolic Panel    Lipid Panel    PSA, Screening    TSH    Hemoglobin A1C    (In Office Administered) Zoster Recombinant Vaccine

## 2022-12-30 DIAGNOSIS — I10 ESSENTIAL HYPERTENSION: ICD-10-CM

## 2022-12-30 RX ORDER — HYDROCHLOROTHIAZIDE 25 MG/1
25 TABLET ORAL DAILY
Qty: 90 TABLET | Refills: 3 | Status: SHIPPED | OUTPATIENT
Start: 2022-12-30 | End: 2023-05-24 | Stop reason: SDUPTHER

## 2022-12-30 NOTE — TELEPHONE ENCOUNTER
----- Message from Latrice Herr sent at 12/30/2022 12:49 PM CST -----  Contact: Neftaly  .Type:  RX Refill Request    Who Called: Neftaly  Refill or New Rx:refill  RX Name and Strength:hydroCHLOROthiazide (HYDRODIURIL) 25 MG tablet  How is the patient currently taking it? (ex. 1XDay):as prescribed  Is this a 30 day or 90 day RX:90  Preferred Pharmacy with phone number:.  [place] [id number] [place] [address] [facility] [address]  Phone: [phone] Fax: [phone]  Local or Mail Order:local  Ordering Provider:Dr. Mohamud  Would the patient rather a call back or a response via [facility]? call  Best Call Back Number:.[phone]  Additional Information:   Thanks  LR

## 2023-01-03 ENCOUNTER — TELEPHONE (OUTPATIENT)
Dept: PREADMISSION TESTING | Facility: HOSPITAL | Age: 58
End: 2023-01-03
Payer: COMMERCIAL

## 2023-01-03 NOTE — TELEPHONE ENCOUNTER
----- Message from Brittney Yeager sent at 12/30/2022  3:06 PM CST -----  Regarding: colonoscopy  Contact: patient  Patient had to wait on cardiac clearance , had PAT already and has cardiac clearance, needs to schedule procedure, would like it on 2/20/23, please call him back at [phone]

## 2023-01-06 ENCOUNTER — PATIENT MESSAGE (OUTPATIENT)
Dept: CARDIOLOGY | Facility: HOSPITAL | Age: 58
End: 2023-01-06
Payer: COMMERCIAL

## 2023-01-06 ENCOUNTER — TELEPHONE (OUTPATIENT)
Dept: PREADMISSION TESTING | Facility: HOSPITAL | Age: 58
End: 2023-01-06
Payer: COMMERCIAL

## 2023-01-06 DIAGNOSIS — Z12.11 SCREEN FOR COLON CANCER: Primary | ICD-10-CM

## 2023-02-01 ENCOUNTER — TELEPHONE (OUTPATIENT)
Dept: CARDIOLOGY | Facility: HOSPITAL | Age: 58
End: 2023-02-01
Payer: COMMERCIAL

## 2023-02-02 ENCOUNTER — TELEPHONE (OUTPATIENT)
Dept: INTERNAL MEDICINE | Facility: CLINIC | Age: 58
End: 2023-02-02
Payer: COMMERCIAL

## 2023-02-02 NOTE — TELEPHONE ENCOUNTER
----- Message from Mohan Sutherland sent at 2/1/2023  3:43 PM CST -----  Regarding: Lab results  Pt would like a call in regards to his lab results, please advise, he can be reached at [phone] (apvo)

## 2023-02-23 ENCOUNTER — HOSPITAL ENCOUNTER (OUTPATIENT)
Facility: HOSPITAL | Age: 58
Discharge: HOME | End: 2023-02-23
Attending: INTERNAL MEDICINE | Admitting: INTERNAL MEDICINE
Payer: COMMERCIAL

## 2023-02-23 ENCOUNTER — ANESTHESIA (OUTPATIENT)
Dept: ENDOSCOPY | Facility: HOSPITAL | Age: 58
End: 2023-02-23
Payer: COMMERCIAL

## 2023-02-23 ENCOUNTER — ANESTHESIA EVENT (OUTPATIENT)
Dept: ENDOSCOPY | Facility: HOSPITAL | Age: 58
End: 2023-02-23
Payer: COMMERCIAL

## 2023-02-23 VITALS
HEART RATE: 58 BPM | BODY MASS INDEX: 35.56 KG/M2 | HEIGHT: 76 IN | WEIGHT: 292 LBS | SYSTOLIC BLOOD PRESSURE: 126 MMHG | OXYGEN SATURATION: 98 % | DIASTOLIC BLOOD PRESSURE: 68 MMHG | RESPIRATION RATE: 18 BRPM | TEMPERATURE: 98 F

## 2023-02-23 DIAGNOSIS — Z12.11 ENCOUNTER FOR SCREENING COLONOSCOPY: Primary | ICD-10-CM

## 2023-02-23 PROCEDURE — 45385 COLONOSCOPY W/LESION REMOVAL: CPT | Mod: PT | Performed by: INTERNAL MEDICINE

## 2023-02-23 PROCEDURE — 27201089 HC SNARE, DISP (ANY): Performed by: INTERNAL MEDICINE

## 2023-02-23 PROCEDURE — 27201028 HC NEEDLE, SCLERO: Performed by: INTERNAL MEDICINE

## 2023-02-23 PROCEDURE — 88305 TISSUE EXAM BY PATHOLOGIST: CPT | Mod: 59 | Performed by: PATHOLOGY

## 2023-02-23 PROCEDURE — 45381 COLONOSCOPY SUBMUCOUS NJX: CPT | Mod: 33,,, | Performed by: INTERNAL MEDICINE

## 2023-02-23 PROCEDURE — 45381 COLONOSCOPY SUBMUCOUS NJX: CPT | Mod: PT | Performed by: INTERNAL MEDICINE

## 2023-02-23 PROCEDURE — 45385 PR COLONOSCOPY,REMV LESN,SNARE: ICD-10-PCS | Mod: 33,,, | Performed by: INTERNAL MEDICINE

## 2023-02-23 PROCEDURE — 27201012 HC FORCEPS, HOT/COLD, DISP: Performed by: INTERNAL MEDICINE

## 2023-02-23 PROCEDURE — 37000008 HC ANESTHESIA 1ST 15 MINUTES: Performed by: INTERNAL MEDICINE

## 2023-02-23 PROCEDURE — 37000009 HC ANESTHESIA EA ADD 15 MINS: Performed by: INTERNAL MEDICINE

## 2023-02-23 PROCEDURE — 45380 COLONOSCOPY AND BIOPSY: CPT | Mod: 59,33,, | Performed by: INTERNAL MEDICINE

## 2023-02-23 PROCEDURE — 45380 COLONOSCOPY AND BIOPSY: CPT | Mod: 59,PT | Performed by: INTERNAL MEDICINE

## 2023-02-23 PROCEDURE — 25000003 PHARM REV CODE 250: Performed by: INTERNAL MEDICINE

## 2023-02-23 PROCEDURE — 25000003 PHARM REV CODE 250: Performed by: NURSE ANESTHETIST, CERTIFIED REGISTERED

## 2023-02-23 PROCEDURE — 45385 COLONOSCOPY W/LESION REMOVAL: CPT | Mod: 33,,, | Performed by: INTERNAL MEDICINE

## 2023-02-23 PROCEDURE — 45381 PR COLONOSCPY,FLEX,W/DIR SUBMUC INJECT: ICD-10-PCS | Mod: 33,,, | Performed by: INTERNAL MEDICINE

## 2023-02-23 PROCEDURE — 45380 PR COLONOSCOPY,BIOPSY: ICD-10-PCS | Mod: 59,33,, | Performed by: INTERNAL MEDICINE

## 2023-02-23 PROCEDURE — 88305 TISSUE EXAM BY PATHOLOGIST: ICD-10-PCS | Mod: 26,,, | Performed by: PATHOLOGY

## 2023-02-23 PROCEDURE — 63600175 PHARM REV CODE 636 W HCPCS: Performed by: NURSE ANESTHETIST, CERTIFIED REGISTERED

## 2023-02-23 PROCEDURE — 88305 TISSUE EXAM BY PATHOLOGIST: CPT | Mod: 26,,, | Performed by: PATHOLOGY

## 2023-02-23 RX ORDER — PROPOFOL 10 MG/ML
VIAL (ML) INTRAVENOUS
Status: DISCONTINUED | OUTPATIENT
Start: 2023-02-23 | End: 2023-02-23

## 2023-02-23 RX ORDER — LIDOCAINE HYDROCHLORIDE 10 MG/ML
INJECTION, SOLUTION EPIDURAL; INFILTRATION; INTRACAUDAL; PERINEURAL
Status: DISCONTINUED | OUTPATIENT
Start: 2023-02-23 | End: 2023-02-23

## 2023-02-23 RX ORDER — DEXTROMETHORPHAN/PSEUDOEPHED 2.5-7.5/.8
DROPS ORAL
Status: DISCONTINUED | OUTPATIENT
Start: 2023-02-23 | End: 2023-02-23 | Stop reason: HOSPADM

## 2023-02-23 RX ADMIN — PROPOFOL 50 MG: 10 INJECTION, EMULSION INTRAVENOUS at 07:02

## 2023-02-23 RX ADMIN — PROPOFOL 50 MG: 10 INJECTION, EMULSION INTRAVENOUS at 08:02

## 2023-02-23 RX ADMIN — PROPOFOL 100 MG: 10 INJECTION, EMULSION INTRAVENOUS at 07:02

## 2023-02-23 RX ADMIN — SODIUM CHLORIDE, SODIUM LACTATE, POTASSIUM CHLORIDE, AND CALCIUM CHLORIDE: .6; .31; .03; .02 INJECTION, SOLUTION INTRAVENOUS at 07:02

## 2023-02-23 RX ADMIN — LIDOCAINE HYDROCHLORIDE 50 MG: 10 INJECTION, SOLUTION EPIDURAL; INFILTRATION; INTRACAUDAL; PERINEURAL at 07:02

## 2023-02-23 NOTE — H&P
PRE PROCEDURE H&P    Patient Name: Neftaly Sousa  MRN: [id number]  : 1965  Date of Procedure:  2023  Referring Physician: MICHELLE Wolff Jr., MD  Primary Physician: Francois Mohamud MD  Procedure Physician: Tiffany Marie MD       Planned Procedure: Colonoscopy  Diagnosis: screening for colon cancer     Chief Complaint: Same as above    HPI: Patient is an 57 y.o. male is here for the above. No previous colonoscopy. No Fhx of CRC, no blood thinners.     Last colonoscopy: none  Family history: none  Anticoagulation: none    Past Medical History:   Past Medical History:   Diagnosis Date    History of seizures as a child  until     Secondary to subdural hematoma    History of subdural hematoma (post traumatic)     Hypertension 2010    Nasal congestion 2021    Routine general medical examination at a [facility] facility 2021        Past Surgical History:  Past Surgical History:   Procedure Laterality Date    NESHA HOLE FOR SUBDURAL HEMATOMA          Home Medications:  Prior to Admission medications    Medication Sig Start Date End Date Taking? Authorizing Provider   fluticasone propionate (FLONASE) 50 mcg/actuation nasal spray 1 spray (50 mcg total) by Each Nostril route once daily. May one spray per nostril twice daily, MAX 2 sprays/nostril/day 21  Yes Francois Mohamud MD   hydroCHLOROthiazide (HYDRODIURIL) 25 MG tablet Take 1 tablet (25 mg total) by mouth once daily. 22 Yes Francois Mohamud MD   KAN EXTRACT ORAL Take 1 tablet by mouth once daily.   Yes Historical Provider   MAGNESIUM CITRATE ORAL Take 160 mg by mouth once daily.   Yes Historical Provider   multivitamin (MULTIPLE VITAMINS ORAL) Take 1 tablet by mouth once daily.   Yes Historical Provider   NIFEdipine (PROCARDIA-XL) 60 MG (OSM) 24 hr tablet Take 1 tablet (60 mg total) by mouth once daily. 22 Yes Grabiel Hopper MD   olmesartan (BENICAR) 40 MG tablet Take 1 tablet (40  "mg total) by mouth once daily. 9/22/22 6/19/23 Yes Francois Mohamud MD   vitamin E acetate, bulk, 125 unit/mL Liqd by Misc.(Non-Drug; Combo Route) route once daily.   Yes Historical Provider   pseudoeph/DM/guaifen/acetamin (TYLENOL COLD MULTI-SYMPTOM ORAL) Take by mouth.    Historical Provider   tadalafiL (CIALIS) 10 MG tablet Take 1 tablet (10 mg total) by mouth daily as needed for Erectile Dysfunction. 9/22/22 9/22/23  Francois Mohamud MD   UNABLE TO FIND Primal Multivitamin    Historical Provider        Allergies:  Review of patient's allergies indicates:  No Known Allergies     Social History:   Social History     Socioeconomic History    Marital status:     Number of children: 3   Occupational History    Occupation:      Comment: [employer]   Tobacco Use    Smoking status: Never    Smokeless tobacco: Never   Substance and Sexual Activity    Alcohol use: No     Alcohol/week: 0.0 standard drinks    Drug use: No    Sexual activity: Yes     Partners: Female       Family History:  Family History   Problem Relation Age of Onset    Stroke Mother     Cancer Father     Lung cancer Father     Cancer Brother     Throat cancer Brother     Diabetes Sister     Hypertension Sister     Diabetes Brother     Diabetes Brother        ROS: No acute cardiac events, no acute respiratory complaints.     Physical Exam (all patients):    BP (!) 159/93 (BP Location: Right arm, Patient Position: Lying)   Pulse (!) 53   Temp 98.8 °F (37.1 °C) (Temporal)   Resp 18   Ht 6' 4" (1.93 m)   Wt 132.5 kg (292 lb)   SpO2 98%   BMI 35.54 kg/m²   Lungs: Clear to auscultation bilaterally, respirations unlabored  Heart: Regular rate and rhythm, S1 and S2 normal, no obvious murmurs  Abdomen:         Soft, non-tender, bowel sounds normal, no masses, no organomegaly    Lab Results   Component Value Date    WBC 6.19 11/22/2022    MCV 91 11/22/2022    RDW 12.9 11/22/2022     11/22/2022    GLU 91 11/22/2022    HGBA1C 5.3 " 11/22/2022    BUN 11 11/22/2022     11/22/2022    K 3.8 11/22/2022    CL 98 11/22/2022        SEDATION PLAN: per anesthesia      History reviewed, vital signs satisfactory, cardiopulmonary status satisfactory, sedation options, risks and plans have been discussed with the patient  All their questions were answered and the patient agrees to the sedation procedures as planned and the patient is deemed an appropriate candidate for the sedation as planned.    The risks, benefits and alternatives of the procedure were discussed with the patient in detail. This discussion was had in the presence of  endoscopy staff. The risks include, risks of adverse reaction to sedation requiring the use of reversal agents, bleeding requiring blood transfusion, perforation requiring surgical intervention and technical failure. Other risks include aspiration leading to respiratory distress and respiratory failure resulting in endotracheal intubation and mechanical ventilation including death. If anesthesia is being utilized for this procedure, it is up to the anesthesiologist to determine airway safety including elective endotracheal intubation. Questions were answered, they agree to proceed. There was no language barriers.      Procedure explained to patient, informed consent obtained and placed in chart.    Tiffany Marie  2/23/2023  6:50 AM

## 2023-02-23 NOTE — ANESTHESIA POSTPROCEDURE EVALUATION
Anesthesia Post Evaluation    Patient: Neftaly Sousa    Procedure(s) Performed: Procedure(s) (LRB):  COLONOSCOPY (N/A)    Final Anesthesia Type: MAC      Patient location during evaluation: GI PACU  Patient participation: Yes- Able to Participate  Level of consciousness: awake and alert  Post-procedure vital signs: reviewed and stable  Pain management: adequate  Airway patency: patent    PONV status at discharge: No PONV  Anesthetic complications: no      Cardiovascular status: blood pressure returned to baseline  Respiratory status: unassisted and spontaneous ventilation  Hydration status: euvolemic  Follow-up not needed.          Vitals Value Taken Time   /68 02/23/23 0831   Temp 36.6 °C (97.9 °F) 02/23/23 0815   Pulse 58 02/23/23 0831   Resp 18 02/23/23 0831   SpO2 98 % 02/23/23 0831         No case tracking events are documented in the log.      Pain/Siobhan Score: Siobhan Score: 10 (2/23/2023  8:31 AM)

## 2023-02-23 NOTE — TRANSFER OF CARE
"Anesthesia Transfer of Care Note    Patient: Neftaly Sousa    Procedure(s) Performed: Procedure(s) (LRB):  COLONOSCOPY (N/A)    Patient location: GI    Anesthesia Type: MAC    Transport from OR: Transported from OR on room air with adequate spontaneous ventilation    Post pain: adequate analgesia    Post assessment: no apparent anesthetic complications    Post vital signs: stable    Level of consciousness: sedated    Nausea/Vomiting: no nausea/vomiting    Complications: none    Transfer of care protocol was followed      Last vitals:   Visit Vitals  BP (!) 159/93 (BP Location: Right arm, Patient Position: Lying)   Pulse (!) 53   Temp 37.1 °C (98.8 °F) (Temporal)   Resp 18   Ht 6' 4" (1.93 m)   Wt 132.5 kg (292 lb)   SpO2 98%   BMI 35.54 kg/m²     "

## 2023-02-23 NOTE — PLAN OF CARE
Dr Marie came to bedside and discussed findings. NO N/V,  no abdominal pain, no GI bleeding, and vitals stable.  Pt discharged from unit.

## 2023-02-23 NOTE — PROVATION PATIENT INSTRUCTIONS
Discharge Summary/Instructions after an Endoscopic Procedure  Patient Name: Neftaly Sousa  Patient MRN: [id number]  Patient YOB: 1965 Thursday, February 23, 2023 Tiffany Marie MD  Dear patient,  As a result of recent federal legislation (The Federal Cures Act), you may   receive lab or pathology results from your procedure in your [facility]   account before your physician is able to contact you. Your physician or   their representative will relay the results to you with their   recommendations at their soonest availability.  Thank you,  RESTRICTIONS:  During your procedure today, you received medications for sedation.  These   medications may affect your judgment, balance and coordination.  Therefore,   for 24 hours, you have the following restrictions:   - DO NOT drive a car, operate machinery, make legal/financial decisions,   sign important papers or drink alcohol.    ACTIVITY:  Today: no heavy lifting, straining or running due to procedural   sedation/anesthesia.  The following day: return to full activity including work.  DIET:  Eat and drink normally unless instructed otherwise.     TREATMENT FOR COMMON SIDE EFFECTS:  - Mild abdominal pain, nausea, belching, bloating or excessive gas:  rest,   eat lightly and use a heating pad.  - Sore Throat: treat with throat lozenges and/or gargle with warm salt   water.  - Because air was used during the procedure, expelling large amounts of air   from your rectum or belching is normal.  - If a bowel prep was taken, you may not have a bowel movement for 1-3 days.    This is normal.  SYMPTOMS TO WATCH FOR AND REPORT TO YOUR PHYSICIAN:  1. Abdominal pain or bloating, other than gas cramps.  2. Chest pain.  3. Back pain.  4. Signs of infection such as: chills or fever occurring within 24 hours   after the procedure.  5. Rectal bleeding, which would show as bright red, maroon, or black stools.   (A tablespoon of blood from the rectum is not serious, especially  if   hemorrhoids are present.)  6. Vomiting.  7. Weakness or dizziness.  GO DIRECTLY TO THE NEAREST EMERGENCY ROOM IF YOU HAVE ANY OF THE FOLLOWING:      Difficulty breathing              Chills and/or fever over 101 F   Persistent vomiting and/or vomiting blood   Severe abdominal pain   Severe chest pain   Black, tarry stools   Bleeding- more than one tablespoon   Any other symptom or condition that you feel may need urgent attention  Your doctor recommends these additional instructions:  If any biopsies were taken, your doctors clinic will contact you in 1 to 2   weeks with any results.  - Discharge patient to home (with escort).   - Resume previous diet.   - Continue present medications.   - No aspirin, ibuprofen, naproxen, or other non-steroidal anti-inflammatory   drugs for 3 weeks after polyp removal.   - Await pathology results.   - Repeat colonoscopy in 3 years for surveillance based on pathology results.     - Return to primary care physician.  For questions, problems or results please call your physician Tiffany Marie MD at Work:  [phone]  If you have any questions about the above instructions, call the GI   department at [phone] or call the endoscopy unit at [phone]   from 7am until 3 pm.  [facility] [place], EMERGENCY ROOM PHONE NUMBER:   [phone]  IF A COMPLICATION OR EMERGENCY SITUATION ARISES AND YOU ARE UNABLE TO REACH   YOUR PHYSICIAN - GO DIRECTLY TO THE EMERGENCY ROOM.  I have read or have had read to me these discharge instructions for my   procedure and have received a written copy.  I understand these   instructions and will follow-up with my physician if I have any questions.     __________________________________       _____________________________________  Nurse Signature                                          Patient/Designated   Responsible Party Signature  MD Tiffany Abrams MD  2/23/2023 8:18:47 AM  This report has been  verified and signed electronically.  Dear patient,  As a result of recent federal legislation (The Federal Cures Act), you may   receive lab or pathology results from your procedure in your [facility]   account before your physician is able to contact you. Your physician or   their representative will relay the results to you with their   recommendations at their soonest availability.  Thank you,  PROVATION

## 2023-02-23 NOTE — ANESTHESIA PREPROCEDURE EVALUATION
02/23/2023  Neftaly Sousa is a 57 y.o., male.      Pre-op Assessment    I have reviewed the Patient Summary Reports.     I have reviewed the Nursing Notes. I have reviewed the NPO Status.   I have reviewed the Medications.     Review of Systems  Anesthesia Hx:  No problems with previous Anesthesia    Cardiovascular:   Hypertension, well controlled ECG has been reviewed. Sinus bradycardia   Left bundle branch block   Left axis deviation   LVH with QRS widening and repolarization abnormality   Abnormal ECG   When compared with ECG of 19-NOV-2021 15:59,   No significant change was found   Confirmed by IGGY JONES MD ([id number]) on 11/22/2022 8:58:56 PM    Endocrine:  Obesity / BMI > 30      Physical Exam  General: Well nourished    Airway:  Mallampati: II   Mouth Opening: Normal  TM Distance: Normal  Tongue: Normal  Neck ROM: Normal ROM    Dental:  Intact    Chest/Lungs:  Normal Respiratory Rate    Heart:  Rate: Normal  Rhythm: Regular Rhythm        Anesthesia Plan  Type of Anesthesia, risks & benefits discussed:    Anesthesia Type: MAC  Intra-op Monitoring Plan: Standard ASA Monitors  Post Op Pain Control Plan: multimodal analgesia  Induction:  IV  Informed Consent: Informed consent signed with the Patient and all parties understand the risks and agree with anesthesia plan.  All questions answered.   ASA Score: 2  Day of Surgery Review of History & Physical: H&P Update referred to the surgeon/provider.I have interviewed and examined the patient. I have reviewed the patient's H&P dated: There are no significant changes.     Ready For Surgery From Anesthesia Perspective.     .

## 2023-02-27 PROBLEM — Z00.00 WELLNESS EXAMINATION: Status: RESOLVED | Noted: 2022-11-22 | Resolved: 2023-02-27

## 2023-03-06 LAB
FINAL PATHOLOGIC DIAGNOSIS: NORMAL
Lab: NORMAL
SUPPLEMENTAL DIAGNOSIS: NORMAL

## 2023-03-07 ENCOUNTER — TELEPHONE (OUTPATIENT)
Dept: GASTROENTEROLOGY | Facility: CLINIC | Age: 58
End: 2023-03-07
Payer: COMMERCIAL

## 2023-03-07 NOTE — TELEPHONE ENCOUNTER
----- Message from Miko Gordillo sent at 3/7/2023 11:24 AM CST -----  Contact: Neftaly  .Type:  Patient Returning Call    Who Called: Neftaly  Who Left Message for Patient: Nurse   Does the patient know what this is regarding?:Colonoscopy   Would the patient rather a call back or a response via [facility]? Call   Best Call Back Number: [phone]

## 2023-03-07 NOTE — PROGRESS NOTES
The polyps removed were precancerous also known as adenomas. They were completely removed. Guidelines recommend a repeat colonoscopy in 3 years. We will send you a reminder as the time nears.      Thanks for trusting us with your healthcare needs and using [facility].     Sincerely,    Tiffany Marie M.D.

## 2023-03-10 ENCOUNTER — HOSPITAL ENCOUNTER (OUTPATIENT)
Dept: CARDIOLOGY | Facility: HOSPITAL | Age: 58
Discharge: HOME | End: 2023-03-10
Attending: STUDENT IN AN ORGANIZED HEALTH CARE EDUCATION/TRAINING PROGRAM
Payer: COMMERCIAL

## 2023-03-10 VITALS
BODY MASS INDEX: 35.56 KG/M2 | DIASTOLIC BLOOD PRESSURE: 82 MMHG | SYSTOLIC BLOOD PRESSURE: 140 MMHG | HEIGHT: 76 IN | WEIGHT: 292 LBS

## 2023-03-10 DIAGNOSIS — Z87.828 HISTORY OF SUBDURAL HEMATOMA (POST TRAUMATIC): ICD-10-CM

## 2023-03-10 DIAGNOSIS — R94.31 ABNORMAL EKG: ICD-10-CM

## 2023-03-10 DIAGNOSIS — N52.9 ERECTILE DYSFUNCTION, UNSPECIFIED ERECTILE DYSFUNCTION TYPE: ICD-10-CM

## 2023-03-10 DIAGNOSIS — I10 ESSENTIAL HYPERTENSION: ICD-10-CM

## 2023-03-10 LAB
AORTIC ROOT ANNULUS: 3.21 CM
AV INDEX (PROSTH): 0.82
AV MEAN GRADIENT: 6 MMHG
AV PEAK GRADIENT: 11 MMHG
AV REGURGITATION PRESSURE HALF TIME: 1228.02 MS
AV VALVE AREA: 2.67 CM2
AV VELOCITY RATIO: 0.78
BSA FOR ECHO PROCEDURE: 2.66 M2
CV ECHO LV RWT: 0.51 CM
DOP CALC AO PEAK VEL: 1.65 M/S
DOP CALC AO VTI: 37.5 CM
DOP CALC LVOT AREA: 3.2 CM2
DOP CALC LVOT DIAMETER: 2.03 CM
DOP CALC LVOT PEAK VEL: 1.28 M/S
DOP CALC LVOT STROKE VOLUME: 99.96 CM3
DOP CALC RVOT PEAK VEL: 0.82 M/S
DOP CALC RVOT VTI: 23 CM
DOP CALCLVOT PEAK VEL VTI: 30.9 CM
E WAVE DECELERATION TIME: 237.18 MSEC
E/A RATIO: 0.89
E/E' RATIO: 7 M/S
ECHO LV POSTERIOR WALL: 1.29 CM (ref 0.6–1.1)
EJECTION FRACTION: 65 %
FRACTIONAL SHORTENING: 37 % (ref 28–44)
INTERVENTRICULAR SEPTUM: 1.84 CM (ref 0.6–1.1)
IVRT: 94.2 MSEC
LA MAJOR: 5.16 CM
LA MINOR: 5.7 CM
LA WIDTH: 3.8 CM
LEFT ATRIUM SIZE: 3.86 CM
LEFT ATRIUM VOLUME INDEX MOD: 29.4 ML/M2
LEFT ATRIUM VOLUME INDEX: 26 ML/M2
LEFT ATRIUM VOLUME MOD: 76.37 CM3
LEFT ATRIUM VOLUME: 67.53 CM3
LEFT INTERNAL DIMENSION IN SYSTOLE: 3.22 CM (ref 2.1–4)
LEFT VENTRICLE DIASTOLIC VOLUME INDEX: 47.56 ML/M2
LEFT VENTRICLE DIASTOLIC VOLUME: 123.66 ML
LEFT VENTRICLE MASS INDEX: 136 G/M2
LEFT VENTRICLE SYSTOLIC VOLUME INDEX: 16 ML/M2
LEFT VENTRICLE SYSTOLIC VOLUME: 41.62 ML
LEFT VENTRICULAR INTERNAL DIMENSION IN DIASTOLE: 5.1 CM (ref 3.5–6)
LEFT VENTRICULAR MASS: 354.03 G
LV LATERAL E/E' RATIO: 7 M/S
LV SEPTAL E/E' RATIO: 7 M/S
LVOT MG: 3.56 MMHG
LVOT MV: 0.88 CM/S
MV PEAK A VEL: 0.79 M/S
MV PEAK E VEL: 0.7 M/S
PISA AR MAX VEL: 3.9 M/S
PISA TR MAX VEL: 2.9 M/S
PULM VEIN S/D RATIO: 1.2
PV MEAN GRADIENT: 1.72 MMHG
PV PEAK D VEL: 0.59 M/S
PV PEAK S VEL: 0.71 M/S
RA MAJOR: 5.11 CM
RA PRESSURE: 3 MMHG
RA WIDTH: 3.28 CM
RIGHT VENTRICULAR END-DIASTOLIC DIMENSION: 3.01 CM
SINUS: 2.86 CM
STJ: 2.79 CM
TDI LATERAL: 0.1 M/S
TDI SEPTAL: 0.1 M/S
TDI: 0.1 M/S
TR MAX PG: 34 MMHG
TV REST PULMONARY ARTERY PRESSURE: 37 MMHG

## 2023-03-10 PROCEDURE — 93306 ECHO (CUPID ONLY): ICD-10-PCS | Mod: 26,,, | Performed by: INTERNAL MEDICINE

## 2023-03-10 PROCEDURE — 93306 TTE W/DOPPLER COMPLETE: CPT | Mod: 26,,, | Performed by: INTERNAL MEDICINE

## 2023-03-10 PROCEDURE — 93306 TTE W/DOPPLER COMPLETE: CPT

## 2023-03-13 ENCOUNTER — TELEPHONE (OUTPATIENT)
Dept: CARDIOLOGY | Facility: CLINIC | Age: 58
End: 2023-03-13
Payer: COMMERCIAL

## 2023-03-13 NOTE — TELEPHONE ENCOUNTER
.LVM for patient to call the office regarding results.      ----- Message from Luciano Lea MD sent at 3/11/2023  7:43 PM CST -----  Heart function normal.mild leak in heart valve of no  clinical significance

## 2023-03-13 NOTE — TELEPHONE ENCOUNTER
Patient was notified of results. All questions were answered. Pt verbalized understanding. Pt will call back with any other questions or concerns.      -Heart function normal.mild leak in heart valve of no  clinical significance       ---- Message from [place] [employer] sent at 3/13/2023 12:00 PM CDT -----  Contact: Neftaly  .Type:  Patient Returning Call    Who Called: Neftaly   Who Left Message for Patient: Reema Florez  Does the patient know what this is regarding?: results  Would the patient rather a call back or a response via [facility]?  Call   Best Call Back Number: [phone]  Additional Information:

## 2023-03-28 ENCOUNTER — PATIENT OUTREACH (OUTPATIENT)
Dept: ADMINISTRATIVE | Facility: HOSPITAL | Age: 58
End: 2023-03-28
Payer: COMMERCIAL

## 2023-03-28 NOTE — PROGRESS NOTES
Working HTN report:     Called to discuss scheduling appointment and to get updated BP reading.  Pt gave telephonic reading 134/82. Remote Bp entered.

## 2023-05-24 DIAGNOSIS — I10 ESSENTIAL HYPERTENSION: ICD-10-CM

## 2023-05-24 RX ORDER — HYDROCHLOROTHIAZIDE 25 MG/1
25 TABLET ORAL DAILY
Qty: 90 TABLET | Refills: 1 | Status: SHIPPED | OUTPATIENT
Start: 2023-05-24 | End: 2024-03-19 | Stop reason: SDUPTHER

## 2023-05-24 RX ORDER — AMLODIPINE BESYLATE 10 MG/1
10 TABLET ORAL DAILY
Qty: 90 TABLET | Refills: 1 | Status: SHIPPED | OUTPATIENT
Start: 2023-05-24 | End: 2024-03-19 | Stop reason: ALTCHOICE

## 2023-05-24 NOTE — TELEPHONE ENCOUNTER
----- Message from Corky Azevedo sent at 5/23/2023  1:19 PM CDT -----  Contact: Patient  Type:  Patient Call          Who Called: patient         Does the patient know what this is regarding?: Requesting a call back for a refill on amLODIPine (NORVASC) 10 MG tablet &  hydroCHLOROthiazide (HYDRODIURIL) 25 MG tablet ; please advise           Would the patient rather a call back or a response via [facility]? Call           Best Call Back Number:[phone] ([place])              Additional Information: [facility] [address] [place] [address]   [phone]

## 2023-06-19 DIAGNOSIS — N52.9 ERECTILE DYSFUNCTION, UNSPECIFIED ERECTILE DYSFUNCTION TYPE: ICD-10-CM

## 2023-06-19 RX ORDER — TADALAFIL 10 MG/1
10 TABLET ORAL DAILY PRN
Qty: 30 TABLET | Refills: 0 | Status: SHIPPED | OUTPATIENT
Start: 2023-06-19 | End: 2024-03-19 | Stop reason: SDUPTHER

## 2023-06-19 NOTE — TELEPHONE ENCOUNTER
----- Message from Sophia Temple sent at 6/19/2023  2:22 PM CDT -----      .Type:  RX Refill Request    Who Called: mary  Refill or New Rx:refill  RX Name and Strength:Tadalafil   How is the patient currently taking it? (ex. 1XDay):as prescribed  Is this a 30 day or 90 day RX:90 day   Preferred Pharmacy with phone number:[id number] - [place], [place] - [id number] [facility] [address]  Phone: [phone] Fax: [phone]   Local or Mail Order:local  Ordering Provider:dr nunez  Would the patient rather a call back or a response via [facility]? no  Best Call Back Number:n/a  Additional Information: n/a      Thanks  DD

## 2023-08-23 ENCOUNTER — TELEPHONE (OUTPATIENT)
Dept: INTERNAL MEDICINE | Facility: CLINIC | Age: 58
End: 2023-08-23
Payer: COMMERCIAL

## 2023-08-23 NOTE — TELEPHONE ENCOUNTER
----- Message from Kalli Gilman sent at 8/23/2023  1:00 PM CDT -----  Type:  RX Refill Request    Who Called: pt  Refill or New Rx:Refill  RX Name and Strength:hydroCHLOROthiazide and NIFEdipine  How is the patient currently taking it? (ex. 1XDay):1XDay  Is this a 30 day or 90 day RX:he would like to get 90  Preferred Pharmacy with phone number:.    [id number] [place], [place] - [id number] [facility] [address]  Phone: [phone] Fax: [phone]     Local or Mail Order:local  Ordering Provider:Dr Mohamud  Would the patient rather a call back or a response via [facility]? call  Best Call Back Number:[phone]  Additional Information: .    Thank you

## 2023-08-23 NOTE — TELEPHONE ENCOUNTER
LM letting patient know that his HCTZ was sent in on 5/24/23 for 90 days with 1 refill to [facility] and he should still have a refill. Also his Procardia was sent in by Cardiology on 11/22/22 for 30 days with 11 refill but this ws sent to the [facility] and he can call [facility] and have them transfer it

## 2024-03-19 ENCOUNTER — LAB VISIT (OUTPATIENT)
Dept: LAB | Facility: HOSPITAL | Age: 59
End: 2024-03-19
Attending: PHYSICIAN ASSISTANT
Payer: COMMERCIAL

## 2024-03-19 ENCOUNTER — OFFICE VISIT (OUTPATIENT)
Dept: INTERNAL MEDICINE | Facility: CLINIC | Age: 59
End: 2024-03-19
Payer: COMMERCIAL

## 2024-03-19 VITALS
SYSTOLIC BLOOD PRESSURE: 126 MMHG | DIASTOLIC BLOOD PRESSURE: 80 MMHG | WEIGHT: 284.19 LBS | HEART RATE: 67 BPM | OXYGEN SATURATION: 98 % | BODY MASS INDEX: 34.61 KG/M2 | HEIGHT: 76 IN

## 2024-03-19 DIAGNOSIS — I10 ESSENTIAL HYPERTENSION: ICD-10-CM

## 2024-03-19 DIAGNOSIS — Z00.00 ANNUAL PHYSICAL EXAM: Primary | ICD-10-CM

## 2024-03-19 DIAGNOSIS — Z12.5 SCREENING FOR PROSTATE CANCER: ICD-10-CM

## 2024-03-19 DIAGNOSIS — R79.89 ELEVATED TSH: ICD-10-CM

## 2024-03-19 DIAGNOSIS — N52.9 ERECTILE DYSFUNCTION, UNSPECIFIED ERECTILE DYSFUNCTION TYPE: ICD-10-CM

## 2024-03-19 PROCEDURE — 99999 PR PBB SHADOW E&M-EST. PATIENT-LVL IV: CPT | Mod: PBBFAC,,, | Performed by: PHYSICIAN ASSISTANT

## 2024-03-19 PROCEDURE — 84153 ASSAY OF PSA TOTAL: CPT | Performed by: PHYSICIAN ASSISTANT

## 2024-03-19 PROCEDURE — 1159F MED LIST DOCD IN RCRD: CPT | Mod: CPTII,S$GLB,, | Performed by: PHYSICIAN ASSISTANT

## 2024-03-19 PROCEDURE — 3008F BODY MASS INDEX DOCD: CPT | Mod: CPTII,S$GLB,, | Performed by: PHYSICIAN ASSISTANT

## 2024-03-19 PROCEDURE — 84439 ASSAY OF FREE THYROXINE: CPT | Performed by: PHYSICIAN ASSISTANT

## 2024-03-19 PROCEDURE — 84443 ASSAY THYROID STIM HORMONE: CPT | Performed by: PHYSICIAN ASSISTANT

## 2024-03-19 PROCEDURE — 3074F SYST BP LT 130 MM HG: CPT | Mod: CPTII,S$GLB,, | Performed by: PHYSICIAN ASSISTANT

## 2024-03-19 PROCEDURE — 85025 COMPLETE CBC W/AUTO DIFF WBC: CPT | Performed by: PHYSICIAN ASSISTANT

## 2024-03-19 PROCEDURE — 36415 COLL VENOUS BLD VENIPUNCTURE: CPT | Performed by: PHYSICIAN ASSISTANT

## 2024-03-19 PROCEDURE — 99396 PREV VISIT EST AGE 40-64: CPT | Mod: S$GLB,,, | Performed by: PHYSICIAN ASSISTANT

## 2024-03-19 PROCEDURE — 3079F DIAST BP 80-89 MM HG: CPT | Mod: CPTII,S$GLB,, | Performed by: PHYSICIAN ASSISTANT

## 2024-03-19 PROCEDURE — 80053 COMPREHEN METABOLIC PANEL: CPT | Performed by: PHYSICIAN ASSISTANT

## 2024-03-19 PROCEDURE — 80061 LIPID PANEL: CPT | Performed by: PHYSICIAN ASSISTANT

## 2024-03-19 RX ORDER — HYDROCHLOROTHIAZIDE 25 MG/1
25 TABLET ORAL DAILY
Qty: 90 TABLET | Refills: 3 | Status: SHIPPED | OUTPATIENT
Start: 2024-03-19 | End: 2025-03-14

## 2024-03-19 RX ORDER — NIFEDIPINE 60 MG/1
60 TABLET, EXTENDED RELEASE ORAL DAILY
Qty: 90 TABLET | Refills: 3 | Status: SHIPPED | OUTPATIENT
Start: 2024-03-19 | End: 2025-03-19

## 2024-03-19 RX ORDER — TADALAFIL 10 MG/1
10 TABLET ORAL DAILY PRN
Qty: 30 TABLET | Refills: 0 | Status: SHIPPED | OUTPATIENT
Start: 2024-03-19 | End: 2024-03-27

## 2024-03-19 NOTE — PROGRESS NOTES
Subjective:      Patient ID: Neftaly Sousa is a 58 y.o. male.    Chief Complaint: Annual Exam    HPI  Here today for his annual exam.   BP stable and controlled on nifedipine and hctz. Occasional ED symptoms due to BP meds. Needs refill on cialis.   Mood good.   No chest pain, shortness of breath, or heart palpitations.   Normal BM.     Needs new pcp, previous pcp no longer works here.     Patient Active Problem List   Diagnosis    Essential hypertension    History of seizures as a child    History of subdural hematoma (post traumatic)    Medication side effects    Adult BMI 37.0-37.9 kg/sq m    Encounter for screening colonoscopy    Erectile dysfunction    Abnormal EKG    Elevated TSH    Allergy to ACE inhibitors    Varicose veins of both lower extremities         Current Outpatient Medications:     KAN EXTRACT ORAL, Take 1 tablet by mouth once daily., Disp: , Rfl:     MAGNESIUM CITRATE ORAL, Take 160 mg by mouth once daily., Disp: , Rfl:     multivitamin (MULTIPLE VITAMINS ORAL), Take 1 tablet by mouth once daily., Disp: , Rfl:     vitamin E acetate, bulk, 125 unit/mL Liqd, by Misc.(Non-Drug; Combo Route) route once daily., Disp: , Rfl:     fluticasone propionate (FLONASE) 50 mcg/actuation nasal spray, 1 spray (50 mcg total) by Each Nostril route once daily. May one spray per nostril twice daily, MAX 2 sprays/nostril/day (Patient not taking: Reported on 3/19/2024), Disp: 9.9 mL, Rfl: 6    hydroCHLOROthiazide (HYDRODIURIL) 25 MG tablet, Take 1 tablet (25 mg total) by mouth once daily., Disp: 90 tablet, Rfl: 3    NIFEdipine (PROCARDIA-XL) 60 MG (OSM) 24 hr tablet, Take 1 tablet (60 mg total) by mouth once daily., Disp: 90 tablet, Rfl: 3    pseudoeph/DM/guaifen/acetamin (TYLENOL COLD MULTI-SYMPTOM ORAL), Take by mouth., Disp: , Rfl:     tadalafiL (CIALIS) 10 MG tablet, Take 1 tablet (10 mg total) by mouth daily as needed for Erectile Dysfunction., Disp: 30 tablet, Rfl: 0    UNABLE TO FIND, Primal  "Multivitamin, Disp: , Rfl:     Review of Systems   Constitutional:  Negative for activity change, appetite change, chills, diaphoresis, fatigue, fever and unexpected weight change.   HENT: Negative.  Negative for congestion, hearing loss, postnasal drip, rhinorrhea, sore throat, trouble swallowing and voice change.    Eyes: Negative.  Negative for visual disturbance.   Respiratory: Negative.  Negative for cough, choking, chest tightness and shortness of breath.    Cardiovascular:  Negative for chest pain, palpitations and leg swelling.   Gastrointestinal:  Negative for abdominal distention, abdominal pain, blood in stool, constipation, diarrhea, nausea and vomiting.   Endocrine: Negative for cold intolerance, heat intolerance, polydipsia and polyuria.   Genitourinary: Negative.  Negative for difficulty urinating and frequency.   Musculoskeletal:  Negative for arthralgias, back pain, gait problem, joint swelling and myalgias.   Skin:  Negative for color change, pallor, rash and wound.   Neurological:  Negative for dizziness, tremors, weakness, light-headedness, numbness and headaches.   Hematological:  Negative for adenopathy.   Psychiatric/Behavioral:  Negative for behavioral problems, confusion, self-injury, sleep disturbance and suicidal ideas. The patient is not nervous/anxious.      Objective:   /80 (BP Location: Right arm, Patient Position: Sitting, BP Method: Large (Manual))   Pulse 67   Ht 6' 4" (1.93 m)   Wt 128.9 kg (284 lb 2.8 oz)   SpO2 98%   BMI 34.59 kg/m²     Physical Exam  Vitals reviewed.   Constitutional:       General: He is not in acute distress.     Appearance: Normal appearance. He is well-developed. He is not ill-appearing, toxic-appearing or diaphoretic.   HENT:      Head: Normocephalic and atraumatic.      Right Ear: Tympanic membrane, ear canal and external ear normal.      Left Ear: Tympanic membrane, ear canal and external ear normal.      Nose: Nose normal.   Eyes:      " Conjunctiva/sclera: Conjunctivae normal.      Pupils: Pupils are equal, round, and reactive to light.   Cardiovascular:      Rate and Rhythm: Normal rate and regular rhythm.      Heart sounds: Normal heart sounds. No murmur heard.     No friction rub. No gallop.   Pulmonary:      Effort: Pulmonary effort is normal. No respiratory distress.      Breath sounds: Normal breath sounds. No wheezing or rales.   Chest:      Chest wall: No tenderness.   Abdominal:      General: There is no distension.      Palpations: Abdomen is soft.      Tenderness: There is no abdominal tenderness.   Musculoskeletal:         General: Normal range of motion.      Cervical back: Normal range of motion and neck supple.   Lymphadenopathy:      Cervical: No cervical adenopathy.   Skin:     General: Skin is warm and dry.      Capillary Refill: Capillary refill takes less than 2 seconds.      Findings: No rash.   Neurological:      Mental Status: He is alert and oriented to person, place, and time.      Motor: No weakness.      Coordination: Coordination normal.      Gait: Gait normal.   Psychiatric:         Mood and Affect: Mood normal.         Behavior: Behavior normal.         Thought Content: Thought content normal.         Judgment: Judgment normal.         Assessment:     1. Annual physical exam    2. Essential hypertension    3. Elevated TSH    4. Screening for prostate cancer    5. Erectile dysfunction, unspecified erectile dysfunction type      Plan:   Annual physical exam    Essential hypertension  -     CBC Auto Differential; Future; Expected date: 03/19/2024  -     Comprehensive Metabolic Panel; Future; Expected date: 03/19/2024  -     Lipid Panel; Future; Expected date: 03/19/2024  -     TSH; Future; Expected date: 03/19/2024  -     hydroCHLOROthiazide (HYDRODIURIL) 25 MG tablet; Take 1 tablet (25 mg total) by mouth once daily.  Dispense: 90 tablet; Refill: 3  -     NIFEdipine (PROCARDIA-XL) 60 MG (OSM) 24 hr tablet; Take 1 tablet  (60 mg total) by mouth once daily.  Dispense: 90 tablet; Refill: 3    Elevated TSH  -     TSH; Future; Expected date: 03/19/2024    Screening for prostate cancer  -     PSA, Screening; Future; Expected date: 03/19/2024    Erectile dysfunction, unspecified erectile dysfunction type  -     tadalafiL (CIALIS) 10 MG tablet; Take 1 tablet (10 mg total) by mouth daily as needed for Erectile Dysfunction.  Dispense: 30 tablet; Refill: 0      Follow up in about 6 months (around 9/19/2024), or if symptoms worsen or fail to improve.

## 2024-03-20 LAB
ALBUMIN SERPL BCP-MCNC: 4.3 G/DL (ref 3.5–5.2)
ALP SERPL-CCNC: 81 U/L (ref 55–135)
ALT SERPL W/O P-5'-P-CCNC: 34 U/L (ref 10–44)
ANION GAP SERPL CALC-SCNC: 7 MMOL/L (ref 8–16)
AST SERPL-CCNC: 31 U/L (ref 10–40)
BASOPHILS # BLD AUTO: 0.05 K/UL (ref 0–0.2)
BASOPHILS NFR BLD: 0.7 % (ref 0–1.9)
BILIRUB SERPL-MCNC: 0.7 MG/DL (ref 0.1–1)
BUN SERPL-MCNC: 16 MG/DL (ref 6–20)
CALCIUM SERPL-MCNC: 10 MG/DL (ref 8.7–10.5)
CHLORIDE SERPL-SCNC: 101 MMOL/L (ref 95–110)
CHOLEST SERPL-MCNC: 152 MG/DL (ref 120–199)
CHOLEST/HDLC SERPL: 4.2 {RATIO} (ref 2–5)
CO2 SERPL-SCNC: 30 MMOL/L (ref 23–29)
COMPLEXED PSA SERPL-MCNC: 0.52 NG/ML (ref 0–4)
CREAT SERPL-MCNC: 1.1 MG/DL (ref 0.5–1.4)
DIFFERENTIAL METHOD BLD: NORMAL
EOSINOPHIL # BLD AUTO: 0.3 K/UL (ref 0–0.5)
EOSINOPHIL NFR BLD: 3.6 % (ref 0–8)
ERYTHROCYTE [DISTWIDTH] IN BLOOD BY AUTOMATED COUNT: 13.2 % (ref 11.5–14.5)
EST. GFR  (NO RACE VARIABLE): >60 ML/MIN/1.73 M^2
GLUCOSE SERPL-MCNC: 86 MG/DL (ref 70–110)
HCT VFR BLD AUTO: 43.3 % (ref 40–54)
HDLC SERPL-MCNC: 36 MG/DL (ref 40–75)
HDLC SERPL: 23.7 % (ref 20–50)
HGB BLD-MCNC: 14.4 G/DL (ref 14–18)
IMM GRANULOCYTES # BLD AUTO: 0.01 K/UL (ref 0–0.04)
IMM GRANULOCYTES NFR BLD AUTO: 0.1 % (ref 0–0.5)
LDLC SERPL CALC-MCNC: 90.6 MG/DL (ref 63–159)
LYMPHOCYTES # BLD AUTO: 2.5 K/UL (ref 1–4.8)
LYMPHOCYTES NFR BLD: 35.4 % (ref 18–48)
MCH RBC QN AUTO: 29.8 PG (ref 27–31)
MCHC RBC AUTO-ENTMCNC: 33.3 G/DL (ref 32–36)
MCV RBC AUTO: 90 FL (ref 82–98)
MONOCYTES # BLD AUTO: 0.6 K/UL (ref 0.3–1)
MONOCYTES NFR BLD: 8.8 % (ref 4–15)
NEUTROPHILS # BLD AUTO: 3.6 K/UL (ref 1.8–7.7)
NEUTROPHILS NFR BLD: 51.4 % (ref 38–73)
NONHDLC SERPL-MCNC: 116 MG/DL
NRBC BLD-RTO: 0 /100 WBC
PLATELET # BLD AUTO: 319 K/UL (ref 150–450)
PMV BLD AUTO: 10.5 FL (ref 9.2–12.9)
POTASSIUM SERPL-SCNC: 4.2 MMOL/L (ref 3.5–5.1)
PROT SERPL-MCNC: 7.7 G/DL (ref 6–8.4)
RBC # BLD AUTO: 4.84 M/UL (ref 4.6–6.2)
SODIUM SERPL-SCNC: 138 MMOL/L (ref 136–145)
T4 FREE SERPL-MCNC: 0.99 NG/DL (ref 0.71–1.51)
TRIGL SERPL-MCNC: 127 MG/DL (ref 30–150)
TSH SERPL DL<=0.005 MIU/L-ACNC: 6.27 UIU/ML (ref 0.4–4)
WBC # BLD AUTO: 6.93 K/UL (ref 3.9–12.7)

## 2024-03-21 ENCOUNTER — PATIENT MESSAGE (OUTPATIENT)
Dept: INTERNAL MEDICINE | Facility: CLINIC | Age: 59
End: 2024-03-21
Payer: COMMERCIAL

## 2024-03-21 DIAGNOSIS — E78.2 MIXED HYPERLIPIDEMIA: Primary | ICD-10-CM

## 2024-03-21 DIAGNOSIS — E03.8 SUBCLINICAL HYPOTHYROIDISM: Primary | ICD-10-CM

## 2024-03-21 RX ORDER — ROSUVASTATIN CALCIUM 5 MG/1
5 TABLET, COATED ORAL DAILY
Qty: 90 TABLET | Refills: 3 | Status: SHIPPED | OUTPATIENT
Start: 2024-03-21 | End: 2025-03-21

## 2024-03-26 DIAGNOSIS — N52.9 ERECTILE DYSFUNCTION, UNSPECIFIED ERECTILE DYSFUNCTION TYPE: ICD-10-CM

## 2024-03-27 RX ORDER — TADALAFIL 10 MG/1
10 TABLET ORAL DAILY PRN
Qty: 30 TABLET | Refills: 0 | Status: SHIPPED | OUTPATIENT
Start: 2024-03-27

## 2024-09-01 DIAGNOSIS — N52.9 ERECTILE DYSFUNCTION, UNSPECIFIED ERECTILE DYSFUNCTION TYPE: ICD-10-CM

## 2024-09-05 RX ORDER — TADALAFIL 10 MG/1
10 TABLET ORAL DAILY PRN
Qty: 30 TABLET | Refills: 0 | Status: SHIPPED | OUTPATIENT
Start: 2024-09-05

## 2024-11-19 ENCOUNTER — PATIENT MESSAGE (OUTPATIENT)
Dept: NEUROLOGY | Facility: CLINIC | Age: 59
End: 2024-11-19
Payer: COMMERCIAL